# Patient Record
Sex: FEMALE | Race: WHITE | Employment: OTHER | URBAN - METROPOLITAN AREA
[De-identification: names, ages, dates, MRNs, and addresses within clinical notes are randomized per-mention and may not be internally consistent; named-entity substitution may affect disease eponyms.]

---

## 2023-07-26 ENCOUNTER — HOSPITAL ENCOUNTER (INPATIENT)
Age: 68
LOS: 1 days | Discharge: HOME | End: 2023-07-27
Attending: EMERGENCY MEDICINE | Admitting: FAMILY MEDICINE
Payer: COMMERCIAL

## 2023-07-26 ENCOUNTER — APPOINTMENT (OUTPATIENT)
Dept: GENERAL RADIOLOGY | Age: 68
End: 2023-07-26
Payer: COMMERCIAL

## 2023-07-26 DIAGNOSIS — R00.1 SYMPTOMATIC BRADYCARDIA: Primary | ICD-10-CM

## 2023-07-26 DIAGNOSIS — I44.0 FIRST DEGREE AV BLOCK: ICD-10-CM

## 2023-07-26 PROBLEM — R79.89 ELEVATED TSH: Status: ACTIVE | Noted: 2023-07-26

## 2023-07-26 PROBLEM — T60.0X1A: Status: ACTIVE | Noted: 2023-07-26

## 2023-07-26 PROBLEM — D64.9 ANEMIA: Status: ACTIVE | Noted: 2023-07-26

## 2023-07-26 PROBLEM — D89.89 AUTOIMMUNE DISORDER (HCC): Status: ACTIVE | Noted: 2023-07-26

## 2023-07-26 PROBLEM — A69.20 LYME DISEASE: Status: ACTIVE | Noted: 2023-07-26

## 2023-07-26 LAB
ALBUMIN SERPL-MCNC: 3.5 G/DL (ref 3.5–5.2)
ALBUMIN/GLOB SERPL: 1.3 {RATIO} (ref 1–2.5)
ALP SERPL-CCNC: 69 U/L (ref 35–104)
ALT SERPL-CCNC: 7 U/L (ref 5–33)
ANION GAP SERPL CALCULATED.3IONS-SCNC: 10 MMOL/L (ref 9–17)
AST SERPL-CCNC: 12 U/L
BASOPHILS # BLD: 0 K/UL (ref 0–0.2)
BASOPHILS NFR BLD: 1 % (ref 0–2)
BILIRUB SERPL-MCNC: 0.4 MG/DL (ref 0.3–1.2)
BUN SERPL-MCNC: 17 MG/DL (ref 8–23)
CALCIUM SERPL-MCNC: 8.8 MG/DL (ref 8.6–10.4)
CHLORIDE SERPL-SCNC: 105 MMOL/L (ref 98–107)
CO2 SERPL-SCNC: 26 MMOL/L (ref 20–31)
CREAT SERPL-MCNC: 1 MG/DL (ref 0.5–0.9)
EOSINOPHIL # BLD: 0.3 K/UL (ref 0–0.4)
EOSINOPHILS RELATIVE PERCENT: 4 % (ref 1–4)
ERYTHROCYTE [DISTWIDTH] IN BLOOD BY AUTOMATED COUNT: 13.5 % (ref 12.5–15.4)
GFR SERPL CREATININE-BSD FRML MDRD: >60 ML/MIN/1.73M2
GLUCOSE SERPL-MCNC: 141 MG/DL (ref 70–99)
HCT VFR BLD AUTO: 35 % (ref 36–46)
HGB BLD-MCNC: 11.5 G/DL (ref 12–16)
LYMPHOCYTES NFR BLD: 2.1 K/UL (ref 1–4.8)
LYMPHOCYTES RELATIVE PERCENT: 32 % (ref 24–44)
MCH RBC QN AUTO: 29.3 PG (ref 26–34)
MCHC RBC AUTO-ENTMCNC: 32.8 G/DL (ref 31–37)
MCV RBC AUTO: 89.4 FL (ref 80–100)
METER GLUCOSE: 139 MG/DL (ref 65–105)
MONOCYTES NFR BLD: 0.4 K/UL (ref 0.1–1.2)
MONOCYTES NFR BLD: 7 % (ref 2–11)
NEUTROPHILS NFR BLD: 56 % (ref 36–66)
NEUTS SEG NFR BLD: 3.7 K/UL (ref 1.8–7.7)
PLATELET # BLD AUTO: 182 K/UL (ref 140–450)
PMV BLD AUTO: 8 FL (ref 6–12)
POTASSIUM SERPL-SCNC: 3.9 MMOL/L (ref 3.7–5.3)
PROT SERPL-MCNC: 6.2 G/DL (ref 6.4–8.3)
RBC # BLD AUTO: 3.92 M/UL (ref 4–5.2)
SODIUM SERPL-SCNC: 141 MMOL/L (ref 135–144)
T4 FREE SERPL-MCNC: 1 NG/DL (ref 0.9–1.7)
TROPONIN I SERPL HS-MCNC: <6 NG/L (ref 0–14)
TROPONIN I SERPL HS-MCNC: <6 NG/L (ref 0–14)
TSH SERPL DL<=0.05 MIU/L-ACNC: 5.56 UIU/ML (ref 0.3–5)
WBC OTHER # BLD: 6.6 K/UL (ref 3.5–11)

## 2023-07-26 PROCEDURE — 99222 1ST HOSP IP/OBS MODERATE 55: CPT | Performed by: FAMILY MEDICINE

## 2023-07-26 PROCEDURE — 84484 ASSAY OF TROPONIN QUANT: CPT

## 2023-07-26 PROCEDURE — 82947 ASSAY GLUCOSE BLOOD QUANT: CPT

## 2023-07-26 PROCEDURE — 36415 COLL VENOUS BLD VENIPUNCTURE: CPT

## 2023-07-26 PROCEDURE — 80053 COMPREHEN METABOLIC PANEL: CPT

## 2023-07-26 PROCEDURE — 99223 1ST HOSP IP/OBS HIGH 75: CPT | Performed by: FAMILY MEDICINE

## 2023-07-26 PROCEDURE — 84439 ASSAY OF FREE THYROXINE: CPT

## 2023-07-26 PROCEDURE — 1210000000 HC MED SURG R&B

## 2023-07-26 PROCEDURE — 93005 ELECTROCARDIOGRAM TRACING: CPT | Performed by: NURSE PRACTITIONER

## 2023-07-26 PROCEDURE — 84443 ASSAY THYROID STIM HORMONE: CPT

## 2023-07-26 PROCEDURE — 2580000003 HC RX 258: Performed by: NURSE PRACTITIONER

## 2023-07-26 PROCEDURE — 99285 EMERGENCY DEPT VISIT HI MDM: CPT

## 2023-07-26 PROCEDURE — 2580000003 HC RX 258

## 2023-07-26 PROCEDURE — 71045 X-RAY EXAM CHEST 1 VIEW: CPT

## 2023-07-26 PROCEDURE — 85027 COMPLETE CBC AUTOMATED: CPT

## 2023-07-26 RX ORDER — ENOXAPARIN SODIUM 100 MG/ML
40 INJECTION SUBCUTANEOUS DAILY
Status: DISCONTINUED | OUTPATIENT
Start: 2023-07-26 | End: 2023-07-27 | Stop reason: HOSPADM

## 2023-07-26 RX ORDER — ONDANSETRON 2 MG/ML
4 INJECTION INTRAMUSCULAR; INTRAVENOUS EVERY 6 HOURS PRN
Status: DISCONTINUED | OUTPATIENT
Start: 2023-07-26 | End: 2023-07-27 | Stop reason: HOSPADM

## 2023-07-26 RX ORDER — SODIUM CHLORIDE 9 MG/ML
INJECTION, SOLUTION INTRAVENOUS PRN
Status: DISCONTINUED | OUTPATIENT
Start: 2023-07-26 | End: 2023-07-27 | Stop reason: HOSPADM

## 2023-07-26 RX ORDER — POLYETHYLENE GLYCOL 3350 17 G/17G
17 POWDER, FOR SOLUTION ORAL DAILY PRN
Status: DISCONTINUED | OUTPATIENT
Start: 2023-07-26 | End: 2023-07-27 | Stop reason: HOSPADM

## 2023-07-26 RX ORDER — SODIUM CHLORIDE 0.9 % (FLUSH) 0.9 %
5-40 SYRINGE (ML) INJECTION EVERY 12 HOURS SCHEDULED
Status: DISCONTINUED | OUTPATIENT
Start: 2023-07-26 | End: 2023-07-27 | Stop reason: HOSPADM

## 2023-07-26 RX ORDER — ACETAMINOPHEN 325 MG/1
650 TABLET ORAL EVERY 6 HOURS PRN
Status: DISCONTINUED | OUTPATIENT
Start: 2023-07-26 | End: 2023-07-27 | Stop reason: HOSPADM

## 2023-07-26 RX ORDER — ACETAMINOPHEN 650 MG/1
650 SUPPOSITORY RECTAL EVERY 6 HOURS PRN
Status: DISCONTINUED | OUTPATIENT
Start: 2023-07-26 | End: 2023-07-27 | Stop reason: HOSPADM

## 2023-07-26 RX ORDER — POTASSIUM CHLORIDE 7.45 MG/ML
10 INJECTION INTRAVENOUS PRN
Status: DISCONTINUED | OUTPATIENT
Start: 2023-07-26 | End: 2023-07-27 | Stop reason: HOSPADM

## 2023-07-26 RX ORDER — SODIUM CHLORIDE 0.9 % (FLUSH) 0.9 %
5-40 SYRINGE (ML) INJECTION PRN
Status: DISCONTINUED | OUTPATIENT
Start: 2023-07-26 | End: 2023-07-27 | Stop reason: HOSPADM

## 2023-07-26 RX ORDER — 0.9 % SODIUM CHLORIDE 0.9 %
1000 INTRAVENOUS SOLUTION INTRAVENOUS ONCE
Status: COMPLETED | OUTPATIENT
Start: 2023-07-26 | End: 2023-07-26

## 2023-07-26 RX ORDER — POTASSIUM CHLORIDE 20 MEQ/1
40 TABLET, EXTENDED RELEASE ORAL PRN
Status: DISCONTINUED | OUTPATIENT
Start: 2023-07-26 | End: 2023-07-27 | Stop reason: HOSPADM

## 2023-07-26 RX ORDER — ONDANSETRON 4 MG/1
4 TABLET, ORALLY DISINTEGRATING ORAL EVERY 8 HOURS PRN
Status: DISCONTINUED | OUTPATIENT
Start: 2023-07-26 | End: 2023-07-27 | Stop reason: HOSPADM

## 2023-07-26 RX ORDER — MAGNESIUM SULFATE IN WATER 40 MG/ML
2000 INJECTION, SOLUTION INTRAVENOUS PRN
Status: DISCONTINUED | OUTPATIENT
Start: 2023-07-26 | End: 2023-07-27 | Stop reason: HOSPADM

## 2023-07-26 RX ADMIN — SODIUM CHLORIDE, PRESERVATIVE FREE 10 ML: 5 INJECTION INTRAVENOUS at 22:49

## 2023-07-26 RX ADMIN — SODIUM CHLORIDE 1000 ML: 9 INJECTION, SOLUTION INTRAVENOUS at 13:59

## 2023-07-26 ASSESSMENT — ENCOUNTER SYMPTOMS
NAUSEA: 1
SHORTNESS OF BREATH: 0
BLOOD IN STOOL: 0
ABDOMINAL PAIN: 0
CONSTIPATION: 0
DIARRHEA: 0
NAUSEA: 0
VOMITING: 0

## 2023-07-26 NOTE — PLAN OF CARE
Problem: Safety - Adult  Goal: Free from fall injury  Outcome: Progressing  Flowsheets (Taken 7/26/2023 1883)  Free From Fall Injury:   Instruct family/caregiver on patient safety   Based on caregiver fall risk screen, instruct family/caregiver to ask for assistance with transferring infant if caregiver noted to have fall risk factors

## 2023-07-26 NOTE — ED PROVIDER NOTES
unlikely at this time. We will plan to transfer/admit the patient to complete cardiac evaluation and observation. I have reviewed the disposition diagnosis with the patient and or their family/guardian. I have answered their questions and given discharge instructions. They voiced understanding of these instructions and did not have any further questions or complaints. REASSESSMENT     ED Course as of 07/26/23 1613   Wed Jul 26, 2023   1331 Patient's EKG was done in the emergency department. Patient does have what appears to be a first-degree block. Patient's EKG is not reading as a first-degree block. But there is also depression in lead III. No previous EKGs to compare to. This is not a STEMI at this time interpreted as first-degree block sinus rhythm. We will repeat EKG in 10 minutes. [AT]   6119 Patient's EKG was done in the emergency department part 2. Reviewed repeat EKG secondary to patient's ST change at lead III. Patient has EKG done at this time showing ventricular rate of 63. IN of 212. QRS is 78. QTc of 435. This is sinus rhythm without large ST changes. This is interpreted as sinus rhythm with first-degree block. [AT]   6602 Spoke with Dr. Dewayne Campuzano who is agreeable to admit the patient   [MR]      ED Course User Index  [AT] Brandon Modi To, DO  [MR] Isela Casey, YUSUF - BOO         CRITICAL CARE TIME       CONSULTS:  IP CONSULT TO CARDIOLOGY    PROCEDURES:  Unless otherwise noted below, none     Procedures        FINAL IMPRESSION      1. Symptomatic bradycardia          DISPOSITION/PLAN   DISPOSITION Admitted 07/26/2023 03:57:06 PM      PATIENT REFERRED TO:  No follow-up provider specified. DISCHARGE MEDICATIONS:  New Prescriptions    No medications on file     Controlled Substances Monitoring:     No flowsheet data found.     (Please note that portions of this note were completed with a voice recognition program.  Efforts were made to edit the dictations but occasionally words are mis-transcribed.)    YUSUF Ansari CNP (electronically signed)  Attending Emergency Physician           YUSUF Ansari CNP  07/26/23 1000 [facility], APRN - CNP  07/26/23 5987

## 2023-07-26 NOTE — H&P
[address] [place] [address] Medicine Residency  Inpatient Service     HISTORY AND PHYSICAL EXAMINATION            Date:   7/26/2023  Patient name:  Fly Egan  Date of admission:  7/26/2023  1:26 PM  MRN:   [id number]  Account:  [de-identified]  YOB: 1955  PCP:    YUSUF Clarke CNP  Room:   [address] [place] [address]  Code Status:    Full Code      History Obtained From:     patient    History of Present Illness:       Patient is a 71 yo female with PMHx of bursitis of shoulder, myositis, and polymyalgia who presented to the ED with bradycardia and dizziness. Patient is admitted for management of anemia and bradycardia. Patient states this is the fourth time these symptoms have presented but she has never had  bradycardia to her knowledge, prior events were due to what she believes, low blood glucose levels. Patient has not seen a doctor in many years and isn't sure about her past medical history. Patient states that she was driving when out of a sudden she started feeling dizzy, stopped at a gas station and lowered herself to the ground. When EMS arrived and was loaded onto the truck patient became less responsive and they noted her heart rate to slow to 37. EMS gave 0.5 atropine and normal saline 500 mL. Patient never lost consciousness and on arrival to the ED patient's heart rate normalized back to the 70s. Patient reported that early in the morning she had a little bit of nausea went to the bathroom and had a bowel movement, afterwards had a peanut butter sandwich before leaving the house. Patient denies a known history of hypertension, diabetes, or thyroid disease. Patient's blood pressure has been mildly elevated and heart rates have remain in the 70's since she's been here. Patient denies taking any prescription medication and only takes over-the-counter CBD Gummies and oil and probiotics. Patient has a family history of cardiac disease.   Patient also mentions Axis 18 degrees    T Axis 66 degrees   Troponin    Collection Time: 07/26/23  4:11 PM   Result Value Ref Range    Troponin, High Sensitivity <6 0 - 14 ng/L       Imaging/Diagnostics:  XR CHEST PORTABLE    Result Date: 7/26/2023  No evidence of acute process. Moderate size hiatal hernia. 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    Autoimmune disorder ([address]) 7/26/2023 Yes    Lyme disease 7/26/2023 Yes     Plan:     Symptomatic Bradycardia  Troponin negative x 2  EKG shows 1st degree block, P-R interval 212   CXR 7/26/2023 no evidence of acute process, moderate size hiatal hernia   Ordered BENTLEY level, may have an autoimmune disease like Lupus   Lyme Abs ordered, pt does state she has had multiple ticks in past but unaware of ever having any target lesions  ACE level for possibility sarcoidosis(note CXR unremarkable)  Organophosphate poisoning considered but no true exposures to patient's knowledge;no abnormal neurologic findings  Consulted cardiology   Telemetry  Anemia   Hbg 11.5  Continue to monitor   Elevated TSH  TSH 5.56  Free T4 1.0         Patient is admitted as inpatient status because of co-morbidities listed above, severity of signs and symptoms as outlined, requirement for current medical therapies and most importantly because of direct risk to patient if care not provided in a hospital setting. Expected length of stay > 48 hours.  Patient is admitted in the Med/Surge    Anticoagulation: Lovenox QD  Dispo: home     Patient was seen, evaluated and discussed with MD Quang Carolina MD  Family Medicine Resident, PGY-1   7/26/2023  7:31 PM    Copy sent to  YUSUF Andrea CNP     Senior Resident Attestation:    I have independently seen Denisse Likinjals and discussed the

## 2023-07-27 ENCOUNTER — APPOINTMENT (OUTPATIENT)
Age: 68
End: 2023-07-27
Payer: COMMERCIAL

## 2023-07-27 VITALS
WEIGHT: 134 LBS | OXYGEN SATURATION: 96 % | DIASTOLIC BLOOD PRESSURE: 77 MMHG | HEART RATE: 63 BPM | RESPIRATION RATE: 16 BRPM | HEIGHT: 62 IN | SYSTOLIC BLOOD PRESSURE: 158 MMHG | TEMPERATURE: 97.9 F | BODY MASS INDEX: 24.66 KG/M2

## 2023-07-27 LAB
ACE SERPL-CCNC: 27 U/L (ref 8–52)
ALBUMIN SERPL-MCNC: 3.5 G/DL (ref 3.5–5.2)
ALBUMIN/GLOB SERPL: 1.2 {RATIO} (ref 1–2.5)
ALP SERPL-CCNC: 71 U/L (ref 35–104)
ALT SERPL-CCNC: 7 U/L (ref 5–33)
ANION GAP SERPL CALCULATED.3IONS-SCNC: 8 MMOL/L (ref 9–17)
AST SERPL-CCNC: 13 U/L
BASOPHILS # BLD: 0 K/UL (ref 0–0.2)
BASOPHILS NFR BLD: 1 % (ref 0–2)
BILIRUB SERPL-MCNC: 0.5 MG/DL (ref 0.3–1.2)
BUN SERPL-MCNC: 14 MG/DL (ref 8–23)
CALCIUM SERPL-MCNC: 8.8 MG/DL (ref 8.6–10.4)
CHLORIDE SERPL-SCNC: 106 MMOL/L (ref 98–107)
CO2 SERPL-SCNC: 26 MMOL/L (ref 20–31)
CREAT SERPL-MCNC: 0.9 MG/DL (ref 0.5–0.9)
ECHO AO ROOT DIAM: 3.5 CM
ECHO AO ROOT INDEX: 2.17 CM/M2
ECHO AV AREA PEAK VELOCITY: 2.7 CM2
ECHO AV AREA VTI: 2.5 CM2
ECHO AV AREA/BSA PEAK VELOCITY: 1.7 CM2/M2
ECHO AV AREA/BSA VTI: 1.6 CM2/M2
ECHO AV MEAN GRADIENT: 4 MMHG
ECHO AV MEAN VELOCITY: 0.9 M/S
ECHO AV PEAK GRADIENT: 6 MMHG
ECHO AV PEAK VELOCITY: 1.3 M/S
ECHO AV VELOCITY RATIO: 0.92
ECHO AV VTI: 32.6 CM
ECHO BSA: 1.63 M2
ECHO BSA: 1.63 M2
ECHO EST RA PRESSURE: 5 MMHG
ECHO IVC PROX: 1.5 CM
ECHO LA AREA 2C: 12.6 CM2
ECHO LA AREA 4C: 16.8 CM2
ECHO LA DIAMETER INDEX: 1.93 CM/M2
ECHO LA DIAMETER: 3.1 CM
ECHO LA MAJOR AXIS: 5.5 CM
ECHO LA MINOR AXIS: 3.8 CM
ECHO LA TO AORTIC ROOT RATIO: 0.89
ECHO LA VOL 2C: 34 ML (ref 22–52)
ECHO LA VOL 4C: 41 ML (ref 22–52)
ECHO LA VOLUME INDEX A2C: 21 ML/M2 (ref 16–34)
ECHO LA VOLUME INDEX A4C: 25 ML/M2 (ref 16–34)
ECHO LV E' LATERAL VELOCITY: 10 CM/S
ECHO LV E' SEPTAL VELOCITY: 6 CM/S
ECHO LV EDV A2C: 39 ML
ECHO LV EDV A4C: 45 ML
ECHO LV EDV INDEX A4C: 28 ML/M2
ECHO LV EDV NDEX A2C: 24 ML/M2
ECHO LV EJECTION FRACTION A2C: 60 %
ECHO LV EJECTION FRACTION A4C: 64 %
ECHO LV EJECTION FRACTION BIPLANE: 60 % (ref 55–100)
ECHO LV ESV A2C: 16 ML
ECHO LV ESV A4C: 16 ML
ECHO LV ESV INDEX A2C: 10 ML/M2
ECHO LV ESV INDEX A4C: 10 ML/M2
ECHO LV FRACTIONAL SHORTENING: 31 % (ref 28–44)
ECHO LV INTERNAL DIMENSION DIASTOLE INDEX: 2.42 CM/M2
ECHO LV INTERNAL DIMENSION DIASTOLIC: 3.9 CM (ref 3.9–5.3)
ECHO LV INTERNAL DIMENSION SYSTOLIC INDEX: 1.68 CM/M2
ECHO LV INTERNAL DIMENSION SYSTOLIC: 2.7 CM
ECHO LV IVSD: 1 CM (ref 0.6–0.9)
ECHO LV MASS 2D: 113.6 G (ref 67–162)
ECHO LV MASS INDEX 2D: 70.5 G/M2 (ref 43–95)
ECHO LV POSTERIOR WALL DIASTOLIC: 0.9 CM (ref 0.6–0.9)
ECHO LV RELATIVE WALL THICKNESS RATIO: 0.46
ECHO LVOT AREA: 2.8 CM2
ECHO LVOT AV VTI INDEX: 0.86
ECHO LVOT DIAM: 1.9 CM
ECHO LVOT MEAN GRADIENT: 3 MMHG
ECHO LVOT PEAK GRADIENT: 5 MMHG
ECHO LVOT PEAK VELOCITY: 1.2 M/S
ECHO LVOT STROKE VOLUME INDEX: 49.5 ML/M2
ECHO LVOT SV: 79.6 ML
ECHO LVOT VTI: 28.1 CM
ECHO MV A VELOCITY: 0.72 M/S
ECHO MV AREA VTI: 2.7 CM2
ECHO MV E DECELERATION TIME (DT): 201 MS
ECHO MV E VELOCITY: 0.68 M/S
ECHO MV E/A RATIO: 0.94
ECHO MV E/E' LATERAL: 6.8
ECHO MV E/E' RATIO (AVERAGED): 9.07
ECHO MV E/E' SEPTAL: 11.33
ECHO MV LVOT VTI INDEX: 1.06
ECHO MV MAX VELOCITY: 0.8 M/S
ECHO MV MEAN GRADIENT: 1 MMHG
ECHO MV MEAN VELOCITY: 0.5 M/S
ECHO MV PEAK GRADIENT: 3 MMHG
ECHO MV VTI: 29.9 CM
ECHO PV MAX VELOCITY: 0.8 M/S
ECHO PV PEAK GRADIENT: 2 MMHG
ECHO RA AREA 4C: 10.2 CM2
ECHO RIGHT VENTRICULAR SYSTOLIC PRESSURE (RVSP): 25 MMHG
ECHO RV FREE WALL PEAK S': 11 CM/S
ECHO RV INTERNAL DIMENSION: 3.4 CM
ECHO RV TAPSE: 2.2 CM (ref 1.7–?)
ECHO TV E WAVE: 0.5 M/S
ECHO TV REGURGITANT MAX VELOCITY: 2.24 M/S
ECHO TV REGURGITANT PEAK GRADIENT: 20 MMHG
EKG ATRIAL RATE: 63 BPM
EKG P AXIS: 58 DEGREES
EKG P-R INTERVAL: 212 MS
EKG Q-T INTERVAL: 426 MS
EKG QRS DURATION: 78 MS
EKG QTC CALCULATION (BAZETT): 435 MS
EKG R AXIS: 18 DEGREES
EKG T AXIS: 66 DEGREES
EKG VENTRICULAR RATE: 63 BPM
EOSINOPHIL # BLD: 0.3 K/UL (ref 0–0.4)
EOSINOPHILS RELATIVE PERCENT: 5 % (ref 1–4)
ERYTHROCYTE [DISTWIDTH] IN BLOOD BY AUTOMATED COUNT: 13.7 % (ref 12.5–15.4)
GFR SERPL CREATININE-BSD FRML MDRD: >60 ML/MIN/1.73M2
GLUCOSE SERPL-MCNC: 106 MG/DL (ref 70–99)
HCT VFR BLD AUTO: 37.1 % (ref 36–46)
HGB BLD-MCNC: 12.3 G/DL (ref 12–16)
LYMPHOCYTES NFR BLD: 2.2 K/UL (ref 1–4.8)
LYMPHOCYTES RELATIVE PERCENT: 37 % (ref 24–44)
MAGNESIUM SERPL-MCNC: 2.1 MG/DL (ref 1.6–2.6)
MCH RBC QN AUTO: 29.6 PG (ref 26–34)
MCHC RBC AUTO-ENTMCNC: 33.2 G/DL (ref 31–37)
MCV RBC AUTO: 89.1 FL (ref 80–100)
MONOCYTES NFR BLD: 0.5 K/UL (ref 0.1–1.2)
MONOCYTES NFR BLD: 9 % (ref 2–11)
NEUTROPHILS NFR BLD: 48 % (ref 36–66)
NEUTS SEG NFR BLD: 2.9 K/UL (ref 1.8–7.7)
PLATELET # BLD AUTO: 201 K/UL (ref 140–450)
PMV BLD AUTO: 8.1 FL (ref 6–12)
POTASSIUM SERPL-SCNC: 3.9 MMOL/L (ref 3.7–5.3)
PROT SERPL-MCNC: 6.4 G/DL (ref 6.4–8.3)
RBC # BLD AUTO: 4.17 M/UL (ref 4–5.2)
SODIUM SERPL-SCNC: 140 MMOL/L (ref 135–144)
WBC OTHER # BLD: 5.9 K/UL (ref 3.5–11)

## 2023-07-27 PROCEDURE — 86617 LYME DISEASE ANTIBODY: CPT

## 2023-07-27 PROCEDURE — 2580000003 HC RX 258

## 2023-07-27 PROCEDURE — 93306 TTE W/DOPPLER COMPLETE: CPT

## 2023-07-27 PROCEDURE — 85027 COMPLETE CBC AUTOMATED: CPT

## 2023-07-27 PROCEDURE — 93306 TTE W/DOPPLER COMPLETE: CPT | Performed by: INTERNAL MEDICINE

## 2023-07-27 PROCEDURE — 36415 COLL VENOUS BLD VENIPUNCTURE: CPT

## 2023-07-27 PROCEDURE — 86618 LYME DISEASE ANTIBODY: CPT

## 2023-07-27 PROCEDURE — 86225 DNA ANTIBODY NATIVE: CPT

## 2023-07-27 PROCEDURE — 93270 REMOTE 30 DAY ECG REV/REPORT: CPT

## 2023-07-27 PROCEDURE — 86038 ANTINUCLEAR ANTIBODIES: CPT

## 2023-07-27 PROCEDURE — 6370000000 HC RX 637 (ALT 250 FOR IP): Performed by: STUDENT IN AN ORGANIZED HEALTH CARE EDUCATION/TRAINING PROGRAM

## 2023-07-27 PROCEDURE — 82164 ANGIOTENSIN I ENZYME TEST: CPT

## 2023-07-27 PROCEDURE — 83735 ASSAY OF MAGNESIUM: CPT

## 2023-07-27 PROCEDURE — 80053 COMPREHEN METABOLIC PANEL: CPT

## 2023-07-27 RX ORDER — LOSARTAN POTASSIUM 25 MG/1
25 TABLET ORAL DAILY
Status: DISCONTINUED | OUTPATIENT
Start: 2023-07-27 | End: 2023-07-27 | Stop reason: HOSPADM

## 2023-07-27 RX ORDER — LOSARTAN POTASSIUM 50 MG/1
50 TABLET ORAL DAILY
Qty: 30 TABLET | Refills: 1 | Status: SHIPPED | OUTPATIENT
Start: 2023-07-27

## 2023-07-27 RX ADMIN — SODIUM CHLORIDE, PRESERVATIVE FREE 10 ML: 5 INJECTION INTRAVENOUS at 09:39

## 2023-07-27 RX ADMIN — LOSARTAN POTASSIUM 25 MG: 25 TABLET, FILM COATED ORAL at 11:12

## 2023-07-27 NOTE — CONSULTS
[facility]    Consult                        Today's Date: 7/27/2023  Patient Name: Arlene Martinez  Date of admission: 7/26/2023  1:26 PM  Patient's age: 70 y.o., 1955  Admission Dx: Bradycardia [R00.1]  Symptomatic bradycardia [R00.1]    Reason for Consult:  Cardiac evaluation    Requesting Physician: Paula Moe MD    CHIEF COMPLAINT:  N/V, dizziness, bradycardia    History Obtained From:  patient, electronic medical record    HISTORY OF PRESENT ILLNESS:      The patient is a 70 y.o.  female who is admitted to the hospital for N/V, symptomatic bradycardia on presentation. Patient is a 69 yo female with PMHx of bursitis of shoulder, myositis, and polymyalgia who presented to the ED with bradycardia and dizziness. Patient is admitted for management of anemia and bradycardia. Patient states this is the fourth time these symptoms have presented but she has never had  bradycardia to her knowledge, prior events were due to what she believes, low blood glucose levels. Patient has not seen a doctor since 2014 and isn't sure about her past medical history. Patient states that she was driving when out of a sudden she started feeling dizzy, stopped at a gas station and lowered herself to the ground. When EMS arrived and was loaded onto the truck patient became less responsive and they noted her heart rate to slow to 37. EMS gave 0.5 atropine and normal saline 500 mL. Patient never lost consciousness and on arrival to the ED patient's heart rate normalized back to the 70s. Patient reported that early in the morning she had a little bit of nausea went to the bathroom and had a bowel movement, afterwards had a peanut butter sandwich before leaving the house. Patient denies a known history of hypertension, diabetes, or thyroid disease. Patient's blood pressure has been mildly elevated and heart rates have remain in the 70's since she's been here.   Patient denies taking any Results   Component Value Date/Time    HDL 39 03/27/2015 09:00 AM    TRIG 121 03/27/2015 09:00 AM     LIVER PROFILE:  Recent Labs     07/26/23  1322 07/27/23  0725   AST 12 13   ALT 7 7   LABALBU 3.5 3.5       IMPRESSION:    Patient Active Problem List   Diagnosis    Hip pain    Frozen shoulder syndrome    Impaired glucose tolerance    Grieving    Post-herpetic polyneuropathy    Symptomatic bradycardia    First degree AV block    Anemia    Elevated TSH     Dizziness  Bradycardia with first degree AVB  Impaired glucose tolerance  Arthritis  Elevated TSH  Hx of anemia    RECOMMENDATIONS:  Stable. Patient has not had any further bradycardia since being on floor. Tele reviewed showing SR with MS 0.22 and HR 60. Denies any further symptoms. States she has had these \"episodes\" several other times but not to this extreme and each other time they resolved after eating so she figured they were \"sugar crashing\" events. Will check echo  Recommend holter monitor for 3 weeks upon discharge  Discussed importance of liberalized PO fluids, Gatorae G2/Powerade Zero daily, and changing positions slowly. Mildly elevated TSH per primary  Keep K >4 and Mg >2. Patient feeling very well now. If echo low risk and no further symptoms after getting up and walking may be discharged from CV standpoint with holter monitor for 3 weeks and OP f/u in clinic. Discussed in detail with patient. Questions/concerns addressed and verb understanding.        Discussed with patient and Nurse    Micah Lopez, APRN - [address] [facility],[address] Cardiology Consult           [phone]

## 2023-07-27 NOTE — DISCHARGE INSTRUCTIONS
Date of admission: 7/26/2023  Date of discharge: 07/27/23    Your care was provided by the attending and resident physicians of the [address] [place] [address] Residency Program. The primary encounter diagnosis was Symptomatic bradycardia. A diagnosis of First degree AV block was also pertinent to this visit. FOLLOW-UP  - Follow up with your primary care physician (Dr. Emerita Nieto is accepting new patients) in 1  weeks. Please call the office for a follow up appointment. - Follow up with your cardiology in 3  weeks. MEDICATIONS  -  your new blood pressure medication from the pharmacy and take as directed. - Continue taking your other home medications as directed. ADDITIONAL INSTRUCTIONS  - Return to the ER for passing out, chest pain, shortness of breath.

## 2023-07-27 NOTE — DISCHARGE SUMMARY
[address] [place] [address] Residency  Inpatient Service    Discharge Summary     Patient ID: Adelaida Forman  :  [id number]   MRN: [id number]     ACCOUNT:  [de-identified]   Patient's PCP: YUSUF Saez CNP  Admit Date: 2023   Discharge Date: 2023  Length of Stay: 1  Code Status:  Full Code  Admitting Physician: Levy Shelton MD  Discharge Physician: Carlos Schwab MD     Active Discharge Diagnoses:     Hospital Problem Lists:  Principal Problem:    Symptomatic bradycardia  Active Problems:    First degree AV block    Anemia    Elevated TSH  Resolved Problems:    * No resolved hospital problems. *      Admission Condition:  fair     Discharged Condition: good    Hospital Stay:     Hospital Course:  Adelaida Forman is a 70 y.o. female with a PMH of bursitis of right shoulder, myositis, and polymyalgia presented to ED with bradycardia and dizziness. Patient was admitted for the management of symptomatic bradycardia. In the ED, patient was mildly hypertensive with normal heart rates. Patient CBC showed anemia, mildly hyperglycemia, elevated TSH, normal T4, and normal troponin x2. EKG showed a sinus rhythm with first-degree AV block, with a DE interval of 212. Chest x-ray showed no evidence of acute process, moderate size hiatal hernia. Patient was given fluids and saturating in room air at 98%. In admission patient has not had any further bradycardia episodes. Cardiology was consulted and performed a echocardiogram showing normal left ventricular systolic function, estimated EF 55-60%. Normal diastolic function, normal RVSP, 25 mmHg. Patient was talked about the importance of hydration. Per cardiology recommendations patient was discharged on losartan for hypertension and with a holter monitor for 3 weeks and is to follow up as outpatient.          Significant therapeutic interventions:  IV fluids     Significant Diagnostic Studies:     Echocardiogram

## 2023-07-27 NOTE — CARE COORDINATION
to RETURNING to current housing: needs pcp   Potential Assistance needed at discharge: N/A            Potential DME:    Patient expects to discharge to: [address] for transportation at discharge: Family    Financial    Payor: Elizabet Odonnell / Plan: [employer] / Product Type: *No Product type* /     Does insurance require precert for SNF: Yes    Potential assistance Purchasing Medications: No  Meds-to-Beds request:        [id number] [place], [address] [phone] - F [address] [place] [id number]  Phone: [phone] Fax: [phone]    [facility] [id number] - [place], [address] [phone] - f [phone]  [address] [facility] [address]  Phone: [phone] Fax: [phone]      Notes:    Factors facilitating achievement of predicted outcomes: Family support, Cooperative, and Pleasant    Barriers to discharge: No pcp     Additional Case Management Notes:   Patient has been staying with her mother at her SI apartments at the [facility]. She has not been to a PCP in 5 years. She does have list at home from her insurance company and declined list from Adelaida Peabody. Admitted with bradycardia and cardiology has been consulted. She was encouraged to follow up with pcp at time of discharge. The Plan for Transition of Care is related to the following treatment goals of Bradycardia [R00.1]  Symptomatic bradycardia [Y50.0]    IF APPLICABLE: The Patient and/or patient representative Florence Wadsworth and her family were provided with a choice of provider and agrees with the discharge plan. Freedom of choice list with basic dialogue that supports the patient's individualized plan of care/goals and shares the quality data associated with the providers was provided to: Patient   Patient Representative Name:       The Patient and/or Patient Representative Agree with the Discharge Plan?  Yes    Angeles Pederson RN  Case Management Department

## 2023-07-27 NOTE — PROGRESS NOTES
Pt given discharge instructions. All questions answered. Event monitor applied, and pt verbalizes understanding. Pt discharged home with all documented belongings.

## 2023-07-27 NOTE — ACP (ADVANCE CARE PLANNING)
Advance Care Planning     Advance Care Planning Activator (Inpatient)  Conversation Note      Date of ACP Conversation: 7/27/2023     Conversation Conducted with: Patient with Decision Making Capacity    ACP Activator: Hari Henry RN          Health Care Decision Maker:     Current Designated Health Care Decision Maker:     Primary Decision Maker: Elena Apodaca - Brother/Sister - [phone]  Click here to complete Healthcare Decision Makers including section of the Healthcare Decision Maker Relationship (ie \"Primary\")  Today we documented Decision Maker(s) consistent with Legal Next of Kin hierarchy. Care Preferences    Ventilation: \"If you were in your present state of health and suddenly became very ill and were unable to breathe on your own, what would your preference be about the use of a ventilator (breathing machine) if it were available to you? \"      Would the patient desire the use of ventilator (breathing machine)?: yes    \"If your health worsens and it becomes clear that your chance of recovery is unlikely, what would your preference be about the use of a ventilator (breathing machine) if it were available to you? \"     Would the patient desire the use of ventilator (breathing machine)?: No      Resuscitation  \"CPR works best to restart the heart when there is a sudden event, like a heart attack, in someone who is otherwise healthy. Unfortunately, CPR does not typically restart the heart for people who have serious health conditions or who are very sick. \"    \"In the event your heart stopped as a result of an underlying serious health condition, would you want attempts to be made to restart your heart (answer \"yes\" for attempt to resuscitate) or would you prefer a natural death (answer \"no\" for do not attempt to resuscitate)? \" yes       [] Yes   [] No   Educated Patient / Marika Landa regarding differences between Advance Directives and portable DNR orders.     Length of ACP Conversation in minutes:      Conversation Outcomes:  ACP discussion completed    Follow-up plan:    [] Schedule follow-up conversation to continue planning  [] Referred individual to Provider for additional questions/concerns   [] Advised patient/agent/surrogate to review completed ACP document and update if needed with changes in condition, patient preferences or care setting    [] This note routed to one or more involved healthcare providers

## 2023-07-27 NOTE — PROGRESS NOTES
[facility]  PROGRESS NOTE    Room # 076/906-18   Name: Victorina Myers            Denominational: Ashley Hernández      Reason for visit: Routine    I visited the patient. Admit Date & Time: 7/26/2023  1:26 PM    Assessment:  Victorina Myers is a 70 y.o. female in the hospital because \"symptomatic bradycardia\". Upon entering the room patient was lying in bed. Patient shared being concerned about her health. Patient also shared being concerned about her cat who is at home and relies solely on pt. 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 Patient shared feeling overwhelmed and stressed because of her many responsibilities. Intervention:  I introduced myself and my title as  I offered space for pt  to express feelings, needs, and concerns and provided a ministry presence. This writer actively listened to patient and provided emotional support. This writer encouraged patient in the practice of self-care. This writer also offered a prayer for pt. Outcome:  Patient expressed gratitude for this  visit     Plan:  Chaplains will remain available to offer spiritual and emotional support as needed. Electronically signed by Keely Cuadra, on 7/27/2023 at 11:17 AM.  [address] [place] [address] Drive -        07/27/23 1114   Encounter Summary   Service Provided For: Patient   Referral/Consult From: [address] Family members   Last Encounter  07/27/23   Complexity of Encounter Moderate   Begin Time 1050   End Time  1110   Total Time Calculated 20 min   Encounter    Type Initial Screen/Assessment   Spiritual/Emotional needs   Type Spiritual Support   Assessment/Intervention/Outcome   Assessment Anxious; Powerlessness;Stress overload   Intervention Active listening;Sustaining Presence/Ministry of presence;Prayer (assurance of)/[place];Explored/Affirmed feelings, thoughts, concerns; Discussed belief

## 2023-07-29 LAB
ANA SER QL IA: NEGATIVE
B BURGDOR IGG SER QL IB: NEGATIVE
B BURGDOR IGM SER QL IB: NEGATIVE
DSDNA IGG SER QL IA: 0.6 IU/ML
NUCLEAR IGG SER IA-RTO: 0.3 U/ML

## 2023-08-01 LAB — LYME ANTIBODY: 0.35

## 2023-08-15 ENCOUNTER — HOSPITAL ENCOUNTER (EMERGENCY)
Age: 68
Discharge: HOME | End: 2023-08-15
Attending: EMERGENCY MEDICINE
Payer: COMMERCIAL

## 2023-08-15 VITALS
SYSTOLIC BLOOD PRESSURE: 188 MMHG | DIASTOLIC BLOOD PRESSURE: 84 MMHG | OXYGEN SATURATION: 98 % | TEMPERATURE: 98.8 F | RESPIRATION RATE: 16 BRPM | HEART RATE: 62 BPM

## 2023-08-15 DIAGNOSIS — I10 ESSENTIAL HYPERTENSION: Primary | ICD-10-CM

## 2023-08-15 LAB
ANION GAP SERPL CALCULATED.3IONS-SCNC: 11 MMOL/L (ref 9–17)
BASOPHILS # BLD: 0.01 K/UL (ref 0–0.2)
BASOPHILS NFR BLD: 0 % (ref 0–2)
BUN SERPL-MCNC: 18 MG/DL (ref 8–23)
CALCIUM SERPL-MCNC: 9.6 MG/DL (ref 8.6–10.4)
CHLORIDE SERPL-SCNC: 106 MMOL/L (ref 98–107)
CO2 SERPL-SCNC: 26 MMOL/L (ref 20–31)
CREAT SERPL-MCNC: 0.8 MG/DL (ref 0.5–0.9)
EOSINOPHIL # BLD: 0.22 K/UL (ref 0–0.4)
EOSINOPHILS RELATIVE PERCENT: 3 % (ref 1–4)
ERYTHROCYTE [DISTWIDTH] IN BLOOD BY AUTOMATED COUNT: 14.1 % (ref 12.5–15.4)
GFR SERPL CREATININE-BSD FRML MDRD: >60 ML/MIN/1.73M2
GLUCOSE SERPL-MCNC: 102 MG/DL (ref 70–99)
HCT VFR BLD AUTO: 43.5 % (ref 36–46)
HGB BLD-MCNC: 13.2 G/DL (ref 12–16)
LYMPHOCYTES NFR BLD: 1.61 K/UL (ref 1–4.8)
LYMPHOCYTES RELATIVE PERCENT: 21 % (ref 24–44)
MCH RBC QN AUTO: 28.3 PG (ref 26–34)
MCHC RBC AUTO-ENTMCNC: 30.3 G/DL (ref 31–37)
MCV RBC AUTO: 93.1 FL (ref 80–100)
MONOCYTES NFR BLD: 0.54 K/UL (ref 0.1–1.2)
MONOCYTES NFR BLD: 7 % (ref 2–11)
NEUTROPHILS NFR BLD: 69 % (ref 36–66)
NEUTS SEG NFR BLD: 5.44 K/UL (ref 1.8–7.7)
PLATELET # BLD AUTO: 186 K/UL (ref 140–450)
PMV BLD AUTO: 10.1 FL (ref 8–14)
POTASSIUM SERPL-SCNC: 4.1 MMOL/L (ref 3.7–5.3)
RBC # BLD AUTO: 4.67 M/UL (ref 4–5.2)
REASON FOR REJECTION: NORMAL
SODIUM SERPL-SCNC: 143 MMOL/L (ref 135–144)
SPECIMEN SOURCE: NORMAL
TROPONIN I SERPL HS-MCNC: <6 NG/L (ref 0–14)
WBC OTHER # BLD: 7.8 K/UL (ref 3.5–11)
ZZ NTE CLEAN UP: ORDERED TEST: NORMAL

## 2023-08-15 PROCEDURE — 2580000003 HC RX 258: Performed by: PHYSICIAN ASSISTANT

## 2023-08-15 PROCEDURE — 80048 BASIC METABOLIC PNL TOTAL CA: CPT

## 2023-08-15 PROCEDURE — 36415 COLL VENOUS BLD VENIPUNCTURE: CPT

## 2023-08-15 PROCEDURE — 99284 EMERGENCY DEPT VISIT MOD MDM: CPT

## 2023-08-15 PROCEDURE — 84484 ASSAY OF TROPONIN QUANT: CPT

## 2023-08-15 PROCEDURE — 85025 COMPLETE CBC W/AUTO DIFF WBC: CPT

## 2023-08-15 RX ORDER — 0.9 % SODIUM CHLORIDE 0.9 %
500 INTRAVENOUS SOLUTION INTRAVENOUS ONCE
Status: COMPLETED | OUTPATIENT
Start: 2023-08-15 | End: 2023-08-15

## 2023-08-15 RX ADMIN — SODIUM CHLORIDE 500 ML: 9 INJECTION, SOLUTION INTRAVENOUS at 14:56

## 2023-08-15 ASSESSMENT — PAIN - FUNCTIONAL ASSESSMENT: PAIN_FUNCTIONAL_ASSESSMENT: NONE - DENIES PAIN

## 2023-08-15 NOTE — DISCHARGE INSTRUCTIONS
Continue taking your home medication. Monitor your BP daily. Hydrate well. PLEASE RETURN TO THE EMERGENCY DEPARTMENT IMMEDIATELY if your symptoms worsen in anyway or in 8-12 hours if not improved for re-evaluation. You should immediately return to the ER for symptoms such as increasing pain, bloody stool, fever, a feeling of passing out, light headed, dizziness, chest pain, shortness of breath, persistent nausea and/or vomiting, numbness or weakness to the arms or legs, coolness or color change of the arms or legs. Please understand that at this time there is no evidence for a more serious underlying process, but that early in the process of an illness or injury, an emergency department workup can be falsely reassuring. You should contact your family doctor within the next 24 hours for a follow up appointment    [facility]!!!    From [facility] and [facility] Emergency Services    On behalf of the Emergency Department staff at [employer], I would like to thank you for giving us the opportunity to address your health care needs and concerns. We hope that during your visit, our service was delivered in a professional and caring manner. Please keep [facility] in mind as we walk with you down the path to your own personal wellness. Please expect an automated text message or email from us so we can ask a few questions about your health and progress. Based on your answers, a clinician may call you back to offer help and instructions. Please understand that early in the process of an illness or injury, an emergency department workup can be falsely reassuring. If you notice any worsening, changing or persistent symptoms please call your family doctor or return to the ER immediately. Tell us how we did during your visit at [email or web address]   and let us know about your experience

## 2023-08-15 NOTE — ED PROVIDER NOTES
Attending Supervising Physician's Attestation Statement  I performed a history and physical examination on the patient and discussed the management with the nurse practitioner. I reviewed and agree with the findings and plan as documented in her note .     Patient is here with complaint of lightheadedness and high blood pressure patient states she was here last month was diagnosed with hypertension however she has not seen a physician well over 10 years prior to that states that she has been very stressed at home with a lot of work she started feeling lightheaded no chest pain or shortness breath numbness tingling weakness objective blood pressure is elevated rechecked it was even higher so she is here for evaluation she denies any complaints at this time  On exam patient has no focal neurological findings    Patient has no focal neurologic deficits no indication of acute neurological event bacterial infection EKG interpreted by me reveals minor sinus bradycardia at 58 no acute ST elevation pression normal UT interval normal QS complex she will be discharged with follow-up as directed return if worse  Condition at discharge stable  Diagnosis hypertension lightheaded    Electronically signed by Sophie Frederick MD on 8/15/23 at 3:52 PM EDT       Sophie Frederick MD  08/15/23 3099

## 2023-08-15 NOTE — ED NOTES
Resting quietly - states feeling same. Pleasant and cooperative. ECG completed and IV fluids started.      Marcello Magaña RN  08/15/23 5845

## 2023-08-15 NOTE — ED NOTES
Pt states under stress w/ caring for mother - mother was up during night couple times last night     Zelalem Cochran RN  08/15/23 7989

## 2023-08-16 LAB
EKG ATRIAL RATE: 58 BPM
EKG P AXIS: 56 DEGREES
EKG P-R INTERVAL: 194 MS
EKG Q-T INTERVAL: 418 MS
EKG QRS DURATION: 70 MS
EKG QTC CALCULATION (BAZETT): 410 MS
EKG R AXIS: 24 DEGREES
EKG T AXIS: 45 DEGREES
EKG VENTRICULAR RATE: 58 BPM

## 2023-08-16 NOTE — ED PROVIDER NOTES
[facility] Emergency Department  [id number] [facility]. [facility] [address]  Phone: [phone]  Fax: [phone]        Pt Name: Henrique Encarnacion  MRN: [id number]  [address] 1955  Date of evaluation: 8/15/23    [address]       Chief Complaint   Patient presents with    Dizziness     Checked BP at home elevated x couple readings - up to 200/systolic. C/o dizziness. Denies SOB or chest pressure       HISTORY OF PRESENT ILLNESS (Location/Symptom, Timing/Onset, Context/Setting, Quality, Duration, Modifying Factors, Severity)      Henrique Encarnacion is a 70 y.o. female with no pertinent PMH who presents to the ED via private auto with HTN and lightheadedness. Patient reports that she does not see a doctor regularly and she recently was admitted for near syncopal event. She has not been bradycardic and hypertensive. She had an extensive work-up and was started on losartan as a result of that visit and states that she has been taking it as prescribed but has been also checking her blood pressures and they have been high. She states that today she had a reading of over 979 systolic. She states that since yesterday she has been feeling lightheaded. Denies a room spinning sensation and reports it was definitely similar to her near syncopal event but nowhere near as severe. Patient relays that she has a significant amount of stress involving care of family and living situations. She was feeling very stressed over the last couple days in particular. Denies any fever, chills, recent illness, chest pain, shortness of breath, syncope, abdominal pain, nausea, vomiting, or any other concerns at this time. PAST MEDICAL / SURGICAL / SOCIAL / FAMILY HISTORY     PMH:  has a past medical history of Bursitis of shoulder, CAD (coronary artery disease), Myositis, and Polymyalgia ([address]). Surgical History:  has no past surgical history on file. Social History:  reports that she has never smoked.

## 2023-08-18 ENCOUNTER — OFFICE VISIT (OUTPATIENT)
Age: 68
End: 2023-08-18
Payer: COMMERCIAL

## 2023-08-18 VITALS
BODY MASS INDEX: 24.77 KG/M2 | DIASTOLIC BLOOD PRESSURE: 65 MMHG | TEMPERATURE: 98.5 F | HEART RATE: 77 BPM | RESPIRATION RATE: 16 BRPM | HEIGHT: 61 IN | WEIGHT: 131.2 LBS | SYSTOLIC BLOOD PRESSURE: 135 MMHG

## 2023-08-18 DIAGNOSIS — R21 SKIN RASH: ICD-10-CM

## 2023-08-18 DIAGNOSIS — R73.9 HIGH BLOOD SUGAR: Primary | ICD-10-CM

## 2023-08-18 DIAGNOSIS — I10 HYPERTENSION, UNSPECIFIED TYPE: ICD-10-CM

## 2023-08-18 DIAGNOSIS — Z09 HOSPITAL DISCHARGE FOLLOW-UP: ICD-10-CM

## 2023-08-18 DIAGNOSIS — E03.9 HYPOTHYROIDISM, UNSPECIFIED TYPE: ICD-10-CM

## 2023-08-18 DIAGNOSIS — I44.0 FIRST DEGREE AV BLOCK: ICD-10-CM

## 2023-08-18 DIAGNOSIS — Z12.11 COLON CANCER SCREENING: ICD-10-CM

## 2023-08-18 DIAGNOSIS — Z13.220 SCREENING FOR LIPID DISORDERS: ICD-10-CM

## 2023-08-18 PROCEDURE — 1123F ACP DISCUSS/DSCN MKR DOCD: CPT

## 2023-08-18 PROCEDURE — 1111F DSCHRG MED/CURRENT MED MERGE: CPT

## 2023-08-18 PROCEDURE — 3074F SYST BP LT 130 MM HG: CPT

## 2023-08-18 PROCEDURE — 3078F DIAST BP <80 MM HG: CPT

## 2023-08-18 PROCEDURE — 99204 OFFICE O/P NEW MOD 45 MIN: CPT

## 2023-08-18 SDOH — ECONOMIC STABILITY: HOUSING INSECURITY
IN THE LAST 12 MONTHS, WAS THERE A TIME WHEN YOU DID NOT HAVE A STEADY PLACE TO SLEEP OR SLEPT IN A SHELTER (INCLUDING NOW)?: NO

## 2023-08-18 SDOH — ECONOMIC STABILITY: FOOD INSECURITY: WITHIN THE PAST 12 MONTHS, YOU WORRIED THAT YOUR FOOD WOULD RUN OUT BEFORE YOU GOT MONEY TO BUY MORE.: NEVER TRUE

## 2023-08-18 SDOH — HEALTH STABILITY: PHYSICAL HEALTH: ON AVERAGE, HOW MANY MINUTES DO YOU ENGAGE IN EXERCISE AT THIS LEVEL?: 120 MIN

## 2023-08-18 SDOH — ECONOMIC STABILITY: FOOD INSECURITY: WITHIN THE PAST 12 MONTHS, THE FOOD YOU BOUGHT JUST DIDN'T LAST AND YOU DIDN'T HAVE MONEY TO GET MORE.: NEVER TRUE

## 2023-08-18 SDOH — ECONOMIC STABILITY: HOUSING INSECURITY: IN THE LAST 12 MONTHS, HOW MANY PLACES HAVE YOU LIVED?: 1

## 2023-08-18 SDOH — ECONOMIC STABILITY: INCOME INSECURITY: IN THE LAST 12 MONTHS, WAS THERE A TIME WHEN YOU WERE NOT ABLE TO PAY THE MORTGAGE OR RENT ON TIME?: NO

## 2023-08-18 SDOH — ECONOMIC STABILITY: INCOME INSECURITY: HOW HARD IS IT FOR YOU TO PAY FOR THE VERY BASICS LIKE FOOD, HOUSING, MEDICAL CARE, AND HEATING?: NOT HARD AT ALL

## 2023-08-18 SDOH — HEALTH STABILITY: PHYSICAL HEALTH: ON AVERAGE, HOW MANY DAYS PER WEEK DO YOU ENGAGE IN MODERATE TO STRENUOUS EXERCISE (LIKE A BRISK WALK)?: 2 DAYS

## 2023-08-18 SDOH — ECONOMIC STABILITY: TRANSPORTATION INSECURITY
IN THE PAST 12 MONTHS, HAS THE LACK OF TRANSPORTATION KEPT YOU FROM MEDICAL APPOINTMENTS OR FROM GETTING MEDICATIONS?: NO

## 2023-08-18 SDOH — ECONOMIC STABILITY: TRANSPORTATION INSECURITY
IN THE PAST 12 MONTHS, HAS LACK OF TRANSPORTATION KEPT YOU FROM MEETINGS, WORK, OR FROM GETTING THINGS NEEDED FOR DAILY LIVING?: NO

## 2023-08-18 ASSESSMENT — PATIENT HEALTH QUESTIONNAIRE - PHQ9
SUM OF ALL RESPONSES TO PHQ QUESTIONS 1-9: 0
SUM OF ALL RESPONSES TO PHQ QUESTIONS 1-9: 0
2. FEELING DOWN, DEPRESSED OR HOPELESS: 0
SUM OF ALL RESPONSES TO PHQ QUESTIONS 1-9: 0
SUM OF ALL RESPONSES TO PHQ9 QUESTIONS 1 & 2: 0
SUM OF ALL RESPONSES TO PHQ QUESTIONS 1-9: 0
1. LITTLE INTEREST OR PLEASURE IN DOING THINGS: 0

## 2023-08-18 ASSESSMENT — SOCIAL DETERMINANTS OF HEALTH (SDOH)
WITHIN THE LAST YEAR, HAVE YOU BEEN HUMILIATED OR EMOTIONALLY ABUSED IN OTHER WAYS BY YOUR PARTNER OR EX-PARTNER?: NO
WITHIN THE LAST YEAR, HAVE YOU BEEN KICKED, HIT, SLAPPED, OR OTHERWISE PHYSICALLY HURT BY YOUR PARTNER OR EX-PARTNER?: NO
WITHIN THE LAST YEAR, HAVE YOU BEEN AFRAID OF YOUR PARTNER OR EX-PARTNER?: NO
WITHIN THE LAST YEAR, HAVE TO BEEN RAPED OR FORCED TO HAVE ANY KIND OF SEXUAL ACTIVITY BY YOUR PARTNER OR EX-PARTNER?: NO

## 2023-08-18 ASSESSMENT — LIFESTYLE VARIABLES
HOW MANY STANDARD DRINKS CONTAINING ALCOHOL DO YOU HAVE ON A TYPICAL DAY: PATIENT DOES NOT DRINK
HOW OFTEN DO YOU HAVE A DRINK CONTAINING ALCOHOL: NEVER

## 2023-08-18 NOTE — PROGRESS NOTES
Angiotensin-Converting Enzyme 07/27/2023 27  8 - 52 U/L Final    Glucose 07/27/2023 106 (H)  70 - 99 mg/dL Final    BUN 07/27/2023 14  8 - 23 mg/dL Final    Creatinine 07/27/2023 0.9  0.5 - 0.9 mg/dL Final    Est, Glom Filt Rate 07/27/2023 >60  >60 mL/min/1.73m2 Final    Comment:       These results are not intended for use in patients <25years of age. eGFR results are calculated without a race factor using the 2021 CKD-EPI equation. Careful clinical correlation is recommended, particularly when comparing to results   calculated using previous equations. The CKD-EPI equation is less accurate in patients with extremes of muscle mass, extra-renal   metabolism of creatine, excessive creatine ingestion, or following therapy that affects   renal tubular secretion.       Calcium 07/27/2023 8.8  8.6 - 10.4 mg/dL Final    Sodium 07/27/2023 140  135 - 144 mmol/L Final    Potassium 07/27/2023 3.9  3.7 - 5.3 mmol/L Final    Chloride 07/27/2023 106  98 - 107 mmol/L Final    CO2 07/27/2023 26  20 - 31 mmol/L Final    Anion Gap 07/27/2023 8 (L)  9 - 17 mmol/L Final    Alkaline Phosphatase 07/27/2023 71  35 - 104 U/L Final    ALT 07/27/2023 7  5 - 33 U/L Final    AST 07/27/2023 13  <32 U/L Final    Total Bilirubin 07/27/2023 0.5  0.3 - 1.2 mg/dL Final    Total Protein 07/27/2023 6.4  6.4 - 8.3 g/dL Final    Albumin 07/27/2023 3.5  3.5 - 5.2 g/dL Final    Albumin/Globulin Ratio 07/27/2023 1.2  1.0 - 2.5 Final    WBC 07/27/2023 5.9  3.5 - 11.0 k/uL Final    RBC 07/27/2023 4.17  4.0 - 5.2 m/uL Final    Hemoglobin 07/27/2023 12.3  12.0 - 16.0 g/dL Final    Hematocrit 07/27/2023 37.1  36 - 46 % Final    MCV 07/27/2023 89.1  80 - 100 fL Final    MCH 07/27/2023 29.6  26 - 34 pg Final    MCHC 07/27/2023 33.2  31 - 37 g/dL Final    RDW 07/27/2023 13.7  12.5 - 15.4 % Final    Platelets 19/03/5369 201  140 - 450 k/uL Final    MPV 07/27/2023 8.1  6.0 - 12.0 fL Final    Neutrophils % 07/27/2023 48  36 - 66 % Final    Lymphocytes %

## 2023-08-18 NOTE — PATIENT INSTRUCTIONS
Thank you for following up with us at [facility] outpatient residency clinic! It was a pleasure to meet you today! Our plan is the following:  - Please follow up with cardiology   - apply hydrocortisone on skin rash   - Continue taking Losartan and monitoring BP at home with the log.   -please get your labs done as soon as possible and the cologuard   - I will see you in 6 weeks       If you have any additional questions or concerns, please call the office ([phone]) and speak to one of the staff. They will triage and forward the message to the doctors! Have a great rest of your day!

## 2023-08-21 ASSESSMENT — ENCOUNTER SYMPTOMS
DIARRHEA: 0
NAUSEA: 0
CHEST TIGHTNESS: 0
WHEEZING: 0
SHORTNESS OF BREATH: 0
VOMITING: 0
ABDOMINAL PAIN: 0

## 2023-08-22 ENCOUNTER — HOSPITAL ENCOUNTER (OUTPATIENT)
Age: 68
Setting detail: SPECIMEN
Discharge: HOME | End: 2023-08-22

## 2023-08-22 DIAGNOSIS — E03.9 HYPOTHYROIDISM, UNSPECIFIED TYPE: ICD-10-CM

## 2023-08-22 DIAGNOSIS — Z13.220 SCREENING FOR LIPID DISORDERS: ICD-10-CM

## 2023-08-22 DIAGNOSIS — I44.0 FIRST DEGREE AV BLOCK: ICD-10-CM

## 2023-08-22 DIAGNOSIS — R73.9 HIGH BLOOD SUGAR: ICD-10-CM

## 2023-08-23 ENCOUNTER — TELEPHONE (OUTPATIENT)
Age: 68
End: 2023-08-23

## 2023-08-23 LAB
CHOLEST SERPL-MCNC: 236 MG/DL
CHOLESTEROL/HDL RATIO: 4.1
ECHO BSA: 1.63 M2
EST. AVERAGE GLUCOSE BLD GHB EST-MCNC: 131 MG/DL
HBA1C MFR BLD: 6.2 % (ref 4–6)
HDLC SERPL-MCNC: 57 MG/DL
LDLC SERPL CALC-MCNC: 154 MG/DL (ref 0–130)
TRIGL SERPL-MCNC: 123 MG/DL
TSH SERPL DL<=0.05 MIU/L-ACNC: 2.97 UIU/ML (ref 0.3–5)

## 2023-08-23 NOTE — TELEPHONE ENCOUNTER
----- Message from Pola Parada MD sent at 8/23/2023 10:54 AM EDT -----  Regarding: cologuard  Good morning, patient Monica Crowder, I got off the phone with her just now and she mention that she isn't able to get the cologuard because they require more information. I am not to sure what extra they need or if they called the office by any chance.      Pola Parada MD

## 2023-08-23 NOTE — TELEPHONE ENCOUNTER
----- Message from Orville Trejo MD sent at 8/23/2023 10:54 AM EDT -----  Regarding: cologuard  Good morning, patient Jose Morrison, I got off the phone with her just now and she mention that she isn't able to get the cologuard because they require more information. I am not to sure what extra they need or if they called the office by any chance.      Orville Trejo MD

## 2023-08-23 NOTE — TELEPHONE ENCOUNTER
Dr Padmini Bryan with the [employer]/ cologard people and they have you as not a Medicare provider so options are: an attending or other physician put in new order under their name, yours has to be cancelled, make sure it is cancelled or it will come over as a duplicate and they will disregard  the new order, patient could pay out of pocket which it's like 600.00 so that's not an option or you can become a Medicare provider!!  Please advise next step-  Thanks!

## 2023-08-24 DIAGNOSIS — Z12.11 COLON CANCER SCREENING: Primary | ICD-10-CM

## 2023-08-24 NOTE — PROGRESS NOTES
Order Only Note --    A new order for Cologuard screening was placed for this patient by the request of her PCP, Dr. Deanna Montalvo.   A new order was required for Medicaid approval.      Kadeem Guaman DO

## 2023-08-24 NOTE — TELEPHONE ENCOUNTER
LM for pt letting her know to expect kit but if she doees run into any other problems to let us know-

## 2023-09-12 ENCOUNTER — OFFICE VISIT (OUTPATIENT)
Age: 68
End: 2023-09-12

## 2023-09-12 VITALS
BODY MASS INDEX: 24.36 KG/M2 | HEART RATE: 68 BPM | DIASTOLIC BLOOD PRESSURE: 87 MMHG | RESPIRATION RATE: 16 BRPM | WEIGHT: 130 LBS | TEMPERATURE: 98.2 F | SYSTOLIC BLOOD PRESSURE: 174 MMHG

## 2023-09-12 DIAGNOSIS — I10 HYPERTENSION, UNSPECIFIED TYPE: Primary | ICD-10-CM

## 2023-09-12 RX ORDER — LOSARTAN POTASSIUM 100 MG/1
100 TABLET ORAL DAILY
Qty: 30 TABLET | Refills: 1 | Status: SHIPPED | OUTPATIENT
Start: 2023-09-12

## 2023-09-12 ASSESSMENT — ENCOUNTER SYMPTOMS
ABDOMINAL PAIN: 0
DIARRHEA: 0
CONSTIPATION: 0
NAUSEA: 0
COUGH: 0
VOMITING: 0
SHORTNESS OF BREATH: 0
PHOTOPHOBIA: 0

## 2023-09-12 NOTE — PATIENT INSTRUCTIONS
Follow up in 1 week BP   Will be increasing Losartan 100mg daily   Please  medication from pharmacy  Please get a new blood pressure cuff and take blood pressure readings 3 times per day   Continue taking a log of your blood pressure medications  If your blood pressure goes above 594 systolic (top number) and 725 diastolic (bottom number) call office   Thank you for your visit today !

## 2023-09-12 NOTE — PROGRESS NOTES
Extraocular Movements: Extraocular movements intact. Conjunctiva/sclera: Conjunctivae normal.   Cardiovascular:      Rate and Rhythm: Normal rate and regular rhythm. Pulses: Normal pulses. Heart sounds: Normal heart sounds. No murmur heard. No friction rub. No gallop. Pulmonary:      Effort: Pulmonary effort is normal.      Breath sounds: Normal breath sounds. No wheezing, rhonchi or rales. Musculoskeletal:      Right lower leg: No edema. Left lower leg: No edema. Neurological:      Mental Status: She is alert. Psychiatric:         Mood and Affect: Mood normal.         Behavior: Behavior normal.         ASSESSMENT/PLAN     1. Hypertension, unspecified type  -     losartan (COZAAR) 100 MG tablet; Take 1 tablet by mouth daily, Disp-30 tablet, R-1Normal   Follow up in 1 week BP   Will be increasing Losartan 100mg daily   Please  medication from pharmacy  Please get a new blood pressure cuff and take blood pressure readings 3 times per day   Continue taking a log of your blood pressure medications  If your blood pressure goes above 255 systolic (top number) and 842 diastolic (bottom number) call office  Rechecked blood pressure which did increase discuss told patient to take a second 50mg tablet of losartan tonight and start the 100mg tablets tomorrow. She will check her blood pressure at home and let us know. Return in about 1 week (around 9/19/2023) for BP check. - Questions/concerns answered. Patient verbalized and expressed understanding. Medications, laboratory testing, imaging, consultation, and follow up as documented in this encounter.      Please be aware portions of this note were completed using voice recognition software and unforeseen errors may have occurred    Patient was discussed with Dr. Jj Vidal    Electronically signed by Willie Smith MD on 9/12/2023 at 4:37 PM

## 2023-09-13 RX ORDER — LOSARTAN POTASSIUM 50 MG/1
50 TABLET ORAL DAILY
Qty: 30 TABLET | Refills: 0 | OUTPATIENT
Start: 2023-09-13

## 2023-09-19 ENCOUNTER — OFFICE VISIT (OUTPATIENT)
Age: 68
End: 2023-09-19
Payer: COMMERCIAL

## 2023-09-19 VITALS
WEIGHT: 130 LBS | SYSTOLIC BLOOD PRESSURE: 123 MMHG | HEIGHT: 62 IN | RESPIRATION RATE: 16 BRPM | BODY MASS INDEX: 23.92 KG/M2 | DIASTOLIC BLOOD PRESSURE: 60 MMHG | HEART RATE: 70 BPM

## 2023-09-19 DIAGNOSIS — I10 PRIMARY HYPERTENSION: Primary | ICD-10-CM

## 2023-09-19 PROCEDURE — 1123F ACP DISCUSS/DSCN MKR DOCD: CPT | Performed by: FAMILY MEDICINE

## 2023-09-19 PROCEDURE — 3074F SYST BP LT 130 MM HG: CPT | Performed by: FAMILY MEDICINE

## 2023-09-19 PROCEDURE — 99211 OFF/OP EST MAY X REQ PHY/QHP: CPT

## 2023-09-19 PROCEDURE — 99213 OFFICE O/P EST LOW 20 MIN: CPT | Performed by: FAMILY MEDICINE

## 2023-09-19 PROCEDURE — 3078F DIAST BP <80 MM HG: CPT | Performed by: FAMILY MEDICINE

## 2023-09-19 NOTE — PROGRESS NOTES
[address] [facility]. S.W Family Medicine Residency  [address] [facility], [address]   Phone: [phone]  Fax: [phone]      Date of Visit:  2023  Patient Name: Denisse Greene   Patient :  1955     Chief Complaint   Patient presents with    Hypertension     F/u BP-did not bring log, 157/87, 148/83 earlier today, took 50 mg today instead of 100. .. HPI:     Denisse Greene is a 70 y.o. female who presents today to discuss blood pressure. She was recently seen here on 23 where she had been having elevated blood pressure readings at home she was advised to increase her losartan to 100mg daily and continue to check blood pressure with a new BP cuff. Still needs new BP cuff and has been using old BP cuff which when compared to ours was higher, she states that she has compared with neighbors and appears to be the same. She states that she has signed up for a free BP cuff that she will get in a couple weeks. Has been taking blood pressure medications as more of an as needed medication. Sleeping habit not great 4am-9am  Has a lot of life stressors right now that she states is causing her blood pressure to increase   Discussed taking the medication everyday even when her blood pressure is good because that means it is working, she should not take the medication if her blood presssure is <801 systolic or HR < 60. She is in understanding of this. Reviewed lipid panel with her and discussed starting a statin, which she is not wanting to do at that at this time. She states that there are supplements that she is taking that is meant to help decrease cholesterol and told her to prevent all supplements that she is taking along with her CBD Gummies so that we can make sure there are no drug interaction. She states that she will bring them in to next visit to discuss if they are okay. Otherwise no acute concerns at this time.      I personally reviewed the patient's past

## 2023-09-19 NOTE — PATIENT INSTRUCTIONS
Follow up with Dr. Lee Chacon on 9/29/23  Continue to take 100mg Losartan everyday   Please continue checking your blood pressure daily  If you can keep a log of blood pressure that would be great  If your blood pressure ever goes less than 586 systolic or your Heart rate is less than 60 please hold medication and call us   If you become symptomatic with lightheadedness, dizziness, passing out, unstable gait please call us. Please bring in all supplements to the next visit   Thank you for your visit!

## 2023-09-21 ASSESSMENT — ENCOUNTER SYMPTOMS
WHEEZING: 0
ABDOMINAL PAIN: 0
SHORTNESS OF BREATH: 0
COUGH: 0
DIARRHEA: 0
CONSTIPATION: 0

## 2023-09-29 ENCOUNTER — OFFICE VISIT (OUTPATIENT)
Age: 68
End: 2023-09-29
Payer: COMMERCIAL

## 2023-09-29 VITALS
SYSTOLIC BLOOD PRESSURE: 158 MMHG | TEMPERATURE: 97.9 F | DIASTOLIC BLOOD PRESSURE: 74 MMHG | BODY MASS INDEX: 24.33 KG/M2 | HEART RATE: 62 BPM | HEIGHT: 62 IN | WEIGHT: 132.2 LBS | RESPIRATION RATE: 16 BRPM | OXYGEN SATURATION: 96 %

## 2023-09-29 DIAGNOSIS — Z78.9 NONSMOKER: ICD-10-CM

## 2023-09-29 DIAGNOSIS — E78.00 HYPERCHOLESTEREMIA: ICD-10-CM

## 2023-09-29 DIAGNOSIS — R73.03 PREDIABETES: ICD-10-CM

## 2023-09-29 DIAGNOSIS — I10 HYPERTENSION, UNSPECIFIED TYPE: Primary | ICD-10-CM

## 2023-09-29 PROCEDURE — 99211 OFF/OP EST MAY X REQ PHY/QHP: CPT

## 2023-09-29 NOTE — PROGRESS NOTES
[address] [facility]. S.W Family Medicine Residency  [address] [facility], [address] [facility]  Phone: [phone]  Fax: [phone]      Date of Visit: .TD  Patient Name: Norah Leblanc   Patient :  1955     ASSESSMENT/PLAN   1. Hypertension, unspecified type   -BP log reports hypertension stage 2   - Continue taking Losartan 100 mg daily   - Patient has appointment with cardiologist on Oct 6, for 1st degree AV block, advised patient to take BP log to visit so they can manage if needed her BP medication regimen    - Patient reports taking BP everyday multiple times, was advised to take blood pressure every other day, once around lunch time. Take more then once only when symptomatic or has a concern. - Will continue to monitor BP and adjust medication if needed. 2. Prediabetes   - Reviewed labs with patient: A1C reported 6.2, patient hesitant to start medications, so was advised to start with lifestyle modifications. - Articles for diet and modifications were attached with discharge summary   - Patient has 3 months to improve, will repeat labs in 3 months. 3. Hypercholesteremia    -Labs reviewed with patient showed: Total cholesterol 236,    - Patient will try first lifestyle modifications before starting medication    - Advised patient to stay away from fried foods and foods heavy on cholesterol.    - Advised patient to stay active and increase exercise weekly   - will repeat labs in 3 months     Will follow up with patient if there any concerns with her cologuard results, or concerns after her cardiology office. Return in about 3 months (around 2023) for repeat labs, HTN .     HPI    Norah Leblanc is a 70 y.o. female who presents today to discuss   Chief Complaint   Patient presents with    Discuss Labs    Follow-up     Patient with PMHx of high blood sugar, HTN, first degree AV block, bursitis of right shoulder, myositis, and polymyalgia was seen

## 2023-09-29 NOTE — PATIENT INSTRUCTIONS
Thank you for following up with us at [facility] outpatient residency clinic! It was a pleasure to meet you today! Our plan is the following:  - Please follow up with cardiology appointment I will be looking for their note  - Please watch out for diet and advise to eat low sodium foods, DASH diet   - As we spoke measure blood pressure every other day, if you have symptoms you can take it, take log to cardiologist   - continue taking your medication  - I will see you in 3 months to repeat labs and see how the diet is going. If you have any additional questions or concerns, please call the office ([phone]) and speak to one of the staff. They will triage and forward the message to the doctors! Have a great rest of your day!

## 2023-10-02 PROBLEM — R73.03 PREDIABETES: Status: ACTIVE | Noted: 2023-10-02

## 2023-10-02 PROBLEM — E78.00 HYPERCHOLESTEREMIA: Status: ACTIVE | Noted: 2023-10-02

## 2023-10-02 ASSESSMENT — ENCOUNTER SYMPTOMS
WHEEZING: 0
ABDOMINAL PAIN: 0
CHEST TIGHTNESS: 0
CONSTIPATION: 0
COUGH: 0
VOMITING: 0
NAUSEA: 0
DIARRHEA: 0
SHORTNESS OF BREATH: 0

## 2023-12-29 ENCOUNTER — OFFICE VISIT (OUTPATIENT)
Age: 68
End: 2023-12-29
Payer: COMMERCIAL

## 2023-12-29 VITALS
HEART RATE: 74 BPM | RESPIRATION RATE: 16 BRPM | TEMPERATURE: 98.3 F | SYSTOLIC BLOOD PRESSURE: 135 MMHG | BODY MASS INDEX: 23.26 KG/M2 | HEIGHT: 62 IN | DIASTOLIC BLOOD PRESSURE: 57 MMHG | WEIGHT: 126.4 LBS

## 2023-12-29 DIAGNOSIS — I10 HYPERTENSION, UNSPECIFIED TYPE: ICD-10-CM

## 2023-12-29 DIAGNOSIS — E78.5 HYPERLIPIDEMIA, UNSPECIFIED HYPERLIPIDEMIA TYPE: ICD-10-CM

## 2023-12-29 DIAGNOSIS — R73.03 PREDIABETES: Primary | ICD-10-CM

## 2023-12-29 PROCEDURE — 3075F SYST BP GE 130 - 139MM HG: CPT

## 2023-12-29 PROCEDURE — 3078F DIAST BP <80 MM HG: CPT

## 2023-12-29 PROCEDURE — 1123F ACP DISCUSS/DSCN MKR DOCD: CPT

## 2023-12-29 PROCEDURE — 99213 OFFICE O/P EST LOW 20 MIN: CPT

## 2023-12-29 RX ORDER — LOSARTAN POTASSIUM 50 MG/1
50 TABLET ORAL DAILY
Qty: 30 TABLET | Refills: 1 | Status: SHIPPED | OUTPATIENT
Start: 2023-12-29 | End: 2024-02-27

## 2023-12-29 RX ORDER — LOSARTAN POTASSIUM 100 MG/1
100 TABLET ORAL DAILY
Qty: 30 TABLET | Refills: 1 | Status: CANCELLED | OUTPATIENT
Start: 2023-12-29

## 2023-12-29 NOTE — PROGRESS NOTES
[facility] Family Medicine Residency  [address] [place] [address]  Phone: [phone]  Fax: [phone]      Date of Visit: .TD  Patient Name: Evelin Frazier   Patient :  1955     ASSESSMENT/PLAN   1. Hypertension, unspecified type   -BP log reports hypertension stage 2   - discontinued Losartan 100 mg and started Losartan 50 mg   - Patient is to log BP once she gets her new BP cuff at home   - Patient is to report to the office when starting a new supplements for extensive review and making sure supplement doesn't interact with current medication.    - will continue to monitor BP    - cardiology appointment on ; will follow up   2. Prediabetes   - 3 Months ago A1C reported 6.2; we discussed about lifestyle modifications and health eating   - Reordered A1C for 3 month follow up     3. Hypercholesteremia    - during visit 3 months ago Total cholesterol 236,    - we discussed about lifestyle modifications before starting medication    - Advised patient to stay away from fried foods and foods heavy on cholesterol.    - Advised patient to stay active and increase exercise weekly   - reordered lab for close monitoring     No follow-ups on file. Will follow up with patient after her annual wellness visit on .     HPI    Evelin Frazier is a 68 y.o. female who presents today to discuss   Chief Complaint   Patient presents with    Hypertension    Medication Refill     Losartin sent to [facility] in [place] - 90 day supply     Patient with PMHx of high blood sugar, HTN, first degree AV block, bursitis of right shoulder, myositis, and polymyalgia presents today to discuss BP. Patient reports taking medication as not prescribed. She reports that some days her BP will be around 120-135/60-75 and has fear if she takes her BP medication it will drop to low and place her in the hospital. Patient started taking some supplement and reports that has

## 2023-12-29 NOTE — PATIENT INSTRUCTIONS
Thank you for following up with us at [facility] outpatient residency clinic! It was a pleasure to meet you today!     Our plan is the following:  - sent your losartan 50 mg to the pharmacy   - please record your blood pressure well at home, I printed out instruction how to do it     If you have any additional questions or concerns, please call the office ([phone]) and speak to one of the staff. They will triage and forward the message to the doctors! Have a great rest of your day!

## 2024-01-29 ENCOUNTER — TELEPHONE (OUTPATIENT)
Age: 69
End: 2024-01-29

## 2024-01-29 ENCOUNTER — OFFICE VISIT (OUTPATIENT)
Age: 69
End: 2024-01-29
Payer: COMMERCIAL

## 2024-01-29 VITALS
HEIGHT: 62 IN | RESPIRATION RATE: 16 BRPM | DIASTOLIC BLOOD PRESSURE: 58 MMHG | BODY MASS INDEX: 23.55 KG/M2 | SYSTOLIC BLOOD PRESSURE: 145 MMHG | TEMPERATURE: 97.8 F | HEART RATE: 70 BPM | WEIGHT: 128 LBS

## 2024-01-29 DIAGNOSIS — Z00.00 MEDICARE ANNUAL WELLNESS VISIT, SUBSEQUENT: Primary | ICD-10-CM

## 2024-01-29 DIAGNOSIS — Z12.11 COLON CANCER SCREENING: Primary | ICD-10-CM

## 2024-01-29 PROCEDURE — 1123F ACP DISCUSS/DSCN MKR DOCD: CPT | Performed by: FAMILY MEDICINE

## 2024-01-29 PROCEDURE — G0439 PPPS, SUBSEQ VISIT: HCPCS | Performed by: FAMILY MEDICINE

## 2024-01-29 ASSESSMENT — LIFESTYLE VARIABLES
HOW MANY STANDARD DRINKS CONTAINING ALCOHOL DO YOU HAVE ON A TYPICAL DAY: PATIENT DOES NOT DRINK
HOW OFTEN DO YOU HAVE A DRINK CONTAINING ALCOHOL: NEVER
HOW MANY STANDARD DRINKS CONTAINING ALCOHOL DO YOU HAVE ON A TYPICAL DAY: PATIENT DOES NOT DRINK

## 2024-01-29 ASSESSMENT — PATIENT HEALTH QUESTIONNAIRE - PHQ9
SUM OF ALL RESPONSES TO PHQ QUESTIONS 1-9: 0
SUM OF ALL RESPONSES TO PHQ QUESTIONS 1-9: 0
SUM OF ALL RESPONSES TO PHQ9 QUESTIONS 1 & 2: 0
SUM OF ALL RESPONSES TO PHQ QUESTIONS 1-9: 0
1. LITTLE INTEREST OR PLEASURE IN DOING THINGS: 0
1. LITTLE INTEREST OR PLEASURE IN DOING THINGS: 0
SUM OF ALL RESPONSES TO PHQ9 QUESTIONS 1 & 2: 0
SUM OF ALL RESPONSES TO PHQ QUESTIONS 1-9: 0
2. FEELING DOWN, DEPRESSED OR HOPELESS: 0
SUM OF ALL RESPONSES TO PHQ QUESTIONS 1-9: 0
SUM OF ALL RESPONSES TO PHQ QUESTIONS 1-9: 0

## 2024-01-29 NOTE — TELEPHONE ENCOUNTER
RENNY Keene!  I saw Evelin for a AWV today.  Please see encounter for full details.  She is willing to get a DEXA if she does not have to lay down.  She is also willing to get a Cologuard and it looks like something happened with the Cologuard the last time it was ordered and it had to be cancelled.  See your previous notes.  Could you please place an order for both of those?  She also declined a mammogram today due to concerns for radiation.  Would you be able to discuss other options with her the next time you see her?  Thanks!

## 2024-01-29 NOTE — PROGRESS NOTES
Support ([employer])  Patient not taking: Reported on 1/29/2024  Provider, Historical, MD       CareTeam (Including outside providers/suppliers regularly involved in providing care):   Patient Care Team:  Levi Keene MD as PCP - General (Family Medicine)  Db Goins MD as Consulting Physician (Internal Medicine)     Reviewed and updated this visit:  Tobacco  Allergies  Meds  Med Hx  Surg Hx  Soc Hx  Fam Hx         Celena Osborn, PharmD Candidate was present for the visit and participated in the encounter.

## 2024-01-29 NOTE — PATIENT INSTRUCTIONS
irregular heartbeat.   After you call 911, the  may tell you to chew 1 adult-strength or 2 to 4 low-dose aspirin. Wait for an ambulance. Do not try to drive yourself.  Watch closely for changes in your health, and be sure to contact your doctor if you have any problems.  Where can you learn more?  Go to https://www.[employer].net/patientEd and enter F075 to learn more about \"A Healthy Heart: Care Instructions.\"  Current as of: June 25, 2023               Content Version: 13.9  © 6841-4706 [employer].   Care instructions adapted under license by [employer]. If you have questions about a medical condition or this instruction, always ask your healthcare professional. [employer] disclaims any warranty or liability for your use of this information.      Personalized Preventive Plan for Evelin Frazier - 1/29/2024  Medicare offers a range of preventive health benefits. Some of the tests and screenings are paid in full while other may be subject to a deductible, co-insurance, and/or copay.    Some of these benefits include a comprehensive review of your medical history including lifestyle, illnesses that may run in your family, and various assessments and screenings as appropriate.    After reviewing your medical record and screening and assessments performed today your provider may have ordered immunizations, labs, imaging, and/or referrals for you.  A list of these orders (if applicable) as well as your Preventive Care list are included within your After Visit Summary for your review.    Other Preventive Recommendations:    A preventive eye exam performed by an eye specialist is recommended every 1-2 years to screen for glaucoma; cataracts, macular degeneration, and other eye disorders.  A preventive dental visit is recommended every 6 months.  Try to get at least 150 minutes of exercise per week or 10,000 steps per day on a pedometer .  Order or download the FREE \"Exercise &

## 2024-01-30 ENCOUNTER — TELEPHONE (OUTPATIENT)
Age: 69
End: 2024-01-30

## 2024-01-30 NOTE — TELEPHONE ENCOUNTER
JESSICA--  Spoke to patient to let her know of upcoming cologuard test and what to expect.  Patient informed me that she was unable to get blood pressure cuff at this time r/t insurance and having to pay OTC.  She did not feel well last evening after eating a large salad and steak.  She went home and had several BM's- normal in consistency.   She also stated that she was feeling strange and went to [facility] to take her BP and it was 157/71  repeat was 147/74. AM today prior to taking medication her BP was 184/86. NO CP, dizziness or vision changes.  Encouraged Patient to come in today at 4pm, but she refused.  Stating \"I did not get any sleep last night and I am going to lay down.  I asked her to take her BP at 4 pm today and if higher than 140 SBP to call and get and appt with our office for tomorrow.  Pt. Verbalized understanding and verbalized s/s to look for and when to go to the ER.

## 2024-02-02 NOTE — TELEPHONE ENCOUNTER
Patient called back.  She is doing better.  She bought a BP cuff yesterday.  Took today and it was 136/72. She will continue to monitor over weekend.

## 2024-03-14 DIAGNOSIS — I10 HYPERTENSION, UNSPECIFIED TYPE: ICD-10-CM

## 2024-03-14 RX ORDER — LOSARTAN POTASSIUM 50 MG/1
50 TABLET ORAL DAILY
Qty: 30 TABLET | Refills: 1 | Status: SHIPPED | OUTPATIENT
Start: 2024-03-14 | End: 2024-05-13

## 2024-04-11 NOTE — PROGRESS NOTES
[facility] Family Medicine Residency  [address] [place] [address]  Phone: [phone]  Fax: [phone]      Date of Visit: .TD  Patient Name: Evelin Frazier   Patient :  1955     ASSESSMENT/PLAN   1. Encounter for hepatitis C screening test for low risk patient  -    Ordered Hepatitis C Antibody; Future for screening   2. Hypertension, unspecified type  - BP will controlled and patient reports no side effects. Will continue on same dosage.       - losartan (COZAAR) 50 MG tablet; Take 1 tablet by mouth daily, Disp-90 tablet, R-1Normal       Return in about 6 months (around 10/12/2024) for HTN.    HPI    Evelin Frazier is a 69 y.o. female who presents today to discuss   Chief Complaint   Patient presents with    Hypertension     Has not checked Bps at home   - gdo     Pt is here for a 3 month follow up on Hypertension. Per last visit patient was concerned that her BP would drop to much with Losartan 100 mg and she requested for dose reduction. In that visit I strongly had suggested that we continue with losartan 100 mg due to better BP control. Pt reported she was not taking it everyday if her BP was normal or in the 130s. As well patient started on some natural supplement that is suppose to decrease blood pressure and she's afraid it'll drop even more. We had discussed about the consequences of not having well controlled BP and compromised with decreasing losartan to 50 mg. Today BP is stable at 135/74, reports no side effects. Will continue patient on this dosage and continue to follow up. Patient is due for a mammogram which she refuses to do, do to fear of radiation. DEXA scan is due as well, but patient wants to revisit that next year for now.   Labs that are pending are HA1C, lipid, and cologuard. Patient today in office did her lab work and will get cologuard done as soon as possible. Will follow up with results.       I personally reviewed the

## 2024-04-12 ENCOUNTER — HOSPITAL ENCOUNTER (OUTPATIENT)
Age: 69
Setting detail: SPECIMEN
Discharge: HOME | End: 2024-04-12

## 2024-04-12 ENCOUNTER — OFFICE VISIT (OUTPATIENT)
Age: 69
End: 2024-04-12
Payer: COMMERCIAL

## 2024-04-12 VITALS
BODY MASS INDEX: 22.34 KG/M2 | DIASTOLIC BLOOD PRESSURE: 74 MMHG | HEIGHT: 62 IN | RESPIRATION RATE: 16 BRPM | WEIGHT: 121.4 LBS | SYSTOLIC BLOOD PRESSURE: 135 MMHG | TEMPERATURE: 97.6 F | HEART RATE: 69 BPM

## 2024-04-12 DIAGNOSIS — E78.5 HYPERLIPIDEMIA, UNSPECIFIED HYPERLIPIDEMIA TYPE: ICD-10-CM

## 2024-04-12 DIAGNOSIS — Z11.59 ENCOUNTER FOR HEPATITIS C SCREENING TEST FOR LOW RISK PATIENT: ICD-10-CM

## 2024-04-12 DIAGNOSIS — Z11.59 ENCOUNTER FOR HEPATITIS C SCREENING TEST FOR LOW RISK PATIENT: Primary | ICD-10-CM

## 2024-04-12 DIAGNOSIS — R73.03 PREDIABETES: ICD-10-CM

## 2024-04-12 DIAGNOSIS — I10 HYPERTENSION, UNSPECIFIED TYPE: ICD-10-CM

## 2024-04-12 LAB
CHOLEST SERPL-MCNC: 214 MG/DL (ref 0–199)
CHOLESTEROL/HDL RATIO: 4
EST. AVERAGE GLUCOSE BLD GHB EST-MCNC: 126 MG/DL
HBA1C MFR BLD: 6 % (ref 4–6)
HCV AB SERPL QL IA: NONREACTIVE
HDLC SERPL-MCNC: 54 MG/DL
LDLC SERPL CALC-MCNC: 136 MG/DL (ref 0–100)
TRIGL SERPL-MCNC: 120 MG/DL
VLDLC SERPL CALC-MCNC: 24 MG/DL

## 2024-04-12 PROCEDURE — 99213 OFFICE O/P EST LOW 20 MIN: CPT

## 2024-04-12 PROCEDURE — 99212 OFFICE O/P EST SF 10 MIN: CPT

## 2024-04-12 PROCEDURE — 3078F DIAST BP <80 MM HG: CPT

## 2024-04-12 PROCEDURE — 3075F SYST BP GE 130 - 139MM HG: CPT

## 2024-04-12 PROCEDURE — 1123F ACP DISCUSS/DSCN MKR DOCD: CPT

## 2024-04-12 RX ORDER — LOSARTAN POTASSIUM 50 MG/1
50 TABLET ORAL DAILY
Qty: 90 TABLET | Refills: 1 | Status: SHIPPED | OUTPATIENT
Start: 2024-04-12 | End: 2024-10-09

## 2024-04-12 NOTE — PATIENT INSTRUCTIONS
Thank you for following up with us at [facility] outpatient residency clinic! It was a pleasure to meet you today!     Our plan is the following:  - continue taking your blood pressure medication as prescribed on daily basis  - get your cologuard done when you can   - will see you back in 6 month unless any concerns arise before that please call the office to be seen sooner     If you have any additional questions or concerns, please call the office ([phone]) and speak to one of the staff. They will triage and forward the message to the doctors! Have a great rest of your day!

## 2024-10-07 DIAGNOSIS — I10 HYPERTENSION, UNSPECIFIED TYPE: ICD-10-CM

## 2024-10-07 RX ORDER — LOSARTAN POTASSIUM 50 MG/1
50 TABLET ORAL DAILY
Qty: 90 TABLET | Refills: 1 | Status: SHIPPED | OUTPATIENT
Start: 2024-10-07 | End: 2025-04-05

## 2024-11-04 ENCOUNTER — OFFICE VISIT (OUTPATIENT)
Age: 69
End: 2024-11-04
Payer: COMMERCIAL

## 2024-11-04 VITALS
BODY MASS INDEX: 21.6 KG/M2 | DIASTOLIC BLOOD PRESSURE: 46 MMHG | WEIGHT: 117.4 LBS | RESPIRATION RATE: 16 BRPM | HEIGHT: 62 IN | TEMPERATURE: 98 F | HEART RATE: 59 BPM | SYSTOLIC BLOOD PRESSURE: 139 MMHG

## 2024-11-04 DIAGNOSIS — I10 HYPERTENSION, UNSPECIFIED TYPE: Primary | ICD-10-CM

## 2024-11-04 PROCEDURE — 99213 OFFICE O/P EST LOW 20 MIN: CPT | Performed by: FAMILY MEDICINE

## 2024-11-04 PROCEDURE — 3078F DIAST BP <80 MM HG: CPT | Performed by: FAMILY MEDICINE

## 2024-11-04 PROCEDURE — 3075F SYST BP GE 130 - 139MM HG: CPT | Performed by: FAMILY MEDICINE

## 2024-11-04 PROCEDURE — 1123F ACP DISCUSS/DSCN MKR DOCD: CPT | Performed by: FAMILY MEDICINE

## 2024-11-04 PROCEDURE — 1159F MED LIST DOCD IN RCRD: CPT | Performed by: FAMILY MEDICINE

## 2024-11-04 PROCEDURE — 99211 OFF/OP EST MAY X REQ PHY/QHP: CPT

## 2024-11-04 SDOH — ECONOMIC STABILITY: FOOD INSECURITY: WITHIN THE PAST 12 MONTHS, YOU WORRIED THAT YOUR FOOD WOULD RUN OUT BEFORE YOU GOT MONEY TO BUY MORE.: NEVER TRUE

## 2024-11-04 SDOH — ECONOMIC STABILITY: FOOD INSECURITY: WITHIN THE PAST 12 MONTHS, THE FOOD YOU BOUGHT JUST DIDN'T LAST AND YOU DIDN'T HAVE MONEY TO GET MORE.: NEVER TRUE

## 2024-11-04 SDOH — ECONOMIC STABILITY: INCOME INSECURITY: HOW HARD IS IT FOR YOU TO PAY FOR THE VERY BASICS LIKE FOOD, HOUSING, MEDICAL CARE, AND HEATING?: NOT HARD AT ALL

## 2024-11-04 ASSESSMENT — ENCOUNTER SYMPTOMS
SHORTNESS OF BREATH: 0
CHEST TIGHTNESS: 0

## 2024-11-04 NOTE — PROGRESS NOTES
[facility]  [address] [place] [address]  Phone: [phone]  Fax: [phone]      Date of Visit: .TD  Patient Name: Evelin Frazier   Patient :  1955     ASSESSMENT/PLAN   1. Hypertension, unspecified type   - BP in office 148/48 repeat 139/46. Pt reports she is very stress out with many things in social life   - Had a  today of family member, having trouble with both cars and trying to get to vote tomorrow before its over    - will continue current medication Losartan 50 mg and monitor closely   - needs to return blood pressure cuff at home for a small size and log BP at home to make sure there isnt a need for medication adjustment. Pt has agreed to this plan.        Return in about 6 weeks (around 2024) for HTN .    HPI    Evelin Frazier is a 69 y.o. female who presents today to discuss   Chief Complaint   Patient presents with    Follow-up    Hypertension       Patient reports today to the office for follow-up of hypertension.  Patient's blood pressure today is 139/46.  The patient reports of feeling very stressed out to go to the appointment as a usual.  Patient also dealing with a family  today.  Patient reports also had a lot of stressors at home such as having 2 vehicles broken down trying to get to look tomorrow.  Patient reports she is compliant with her medication at home currently has a blood pressure cuff that is too large for her, guided her to reorder a smaller cuff or get that advised from her [facility].  Patient is sent home with a blood pressure log to monitor her blood pressures be follow-up in 6 weeks to make sure her dose is correct for her.     I personally reviewed the patient's past medical history, current medications, allergies, surgical history, family history and social history.  Updates were made as necessary.    REVIEW OF SYSTEM    Review of Systems   Constitutional:  Negative for

## 2024-11-04 NOTE — PATIENT INSTRUCTIONS
Thank you for following up with us at [facility] outpatient residency clinic! It was a pleasure to meet you today!     Our plan is the following:  - we will continue your current medication as is  - as we spoke try to get that blood pressure cuff returned at [facility] and get the right size and log some blood pressures for us  - we will follow up in 6 weeks     If you have any additional questions or concerns, please call the office ([phone]) and speak to one of the staff. They will triage and forward the message to the doctors! Have a great rest of your day!

## 2025-04-16 DIAGNOSIS — I10 HYPERTENSION, UNSPECIFIED TYPE: ICD-10-CM

## 2025-04-16 RX ORDER — LOSARTAN POTASSIUM 50 MG/1
50 TABLET ORAL DAILY
Qty: 90 TABLET | Refills: 1 | Status: SHIPPED | OUTPATIENT
Start: 2025-04-16 | End: 2025-10-13

## 2025-04-25 ENCOUNTER — OFFICE VISIT (OUTPATIENT)
Age: 70
End: 2025-04-25
Payer: COMMERCIAL

## 2025-04-25 VITALS
DIASTOLIC BLOOD PRESSURE: 84 MMHG | BODY MASS INDEX: 21.9 KG/M2 | WEIGHT: 119 LBS | HEART RATE: 64 BPM | SYSTOLIC BLOOD PRESSURE: 129 MMHG | HEIGHT: 62 IN | TEMPERATURE: 97.8 F | RESPIRATION RATE: 14 BRPM

## 2025-04-25 DIAGNOSIS — E78.00 HYPERCHOLESTEREMIA: ICD-10-CM

## 2025-04-25 DIAGNOSIS — R79.89 ELEVATED TSH: ICD-10-CM

## 2025-04-25 DIAGNOSIS — Z12.11 SCREENING FOR COLON CANCER: ICD-10-CM

## 2025-04-25 DIAGNOSIS — I10 PRIMARY HYPERTENSION: Primary | ICD-10-CM

## 2025-04-25 DIAGNOSIS — Z78.0 POST-MENOPAUSAL: ICD-10-CM

## 2025-04-25 DIAGNOSIS — R00.1 BRADYCARDIA: ICD-10-CM

## 2025-04-25 DIAGNOSIS — Z78.0 POSTMENOPAUSAL: ICD-10-CM

## 2025-04-25 DIAGNOSIS — R73.03 PREDIABETES: ICD-10-CM

## 2025-04-25 PROCEDURE — 3074F SYST BP LT 130 MM HG: CPT

## 2025-04-25 PROCEDURE — 1159F MED LIST DOCD IN RCRD: CPT

## 2025-04-25 PROCEDURE — 3078F DIAST BP <80 MM HG: CPT

## 2025-04-25 PROCEDURE — 99213 OFFICE O/P EST LOW 20 MIN: CPT

## 2025-04-25 PROCEDURE — 1123F ACP DISCUSS/DSCN MKR DOCD: CPT

## 2025-04-25 PROCEDURE — 99212 OFFICE O/P EST SF 10 MIN: CPT

## 2025-04-25 SDOH — ECONOMIC STABILITY: FOOD INSECURITY: WITHIN THE PAST 12 MONTHS, THE FOOD YOU BOUGHT JUST DIDN'T LAST AND YOU DIDN'T HAVE MONEY TO GET MORE.: NEVER TRUE

## 2025-04-25 SDOH — ECONOMIC STABILITY: FOOD INSECURITY: WITHIN THE PAST 12 MONTHS, YOU WORRIED THAT YOUR FOOD WOULD RUN OUT BEFORE YOU GOT MONEY TO BUY MORE.: NEVER TRUE

## 2025-04-25 ASSESSMENT — ENCOUNTER SYMPTOMS
VOMITING: 0
WHEEZING: 0
SHORTNESS OF BREATH: 0
COLOR CHANGE: 0
COUGH: 0
CONSTIPATION: 0
ABDOMINAL PAIN: 0
DIARRHEA: 0

## 2025-04-25 ASSESSMENT — PATIENT HEALTH QUESTIONNAIRE - PHQ9
2. FEELING DOWN, DEPRESSED OR HOPELESS: NOT AT ALL
SUM OF ALL RESPONSES TO PHQ QUESTIONS 1-9: 0
SUM OF ALL RESPONSES TO PHQ QUESTIONS 1-9: 0
1. LITTLE INTEREST OR PLEASURE IN DOING THINGS: NOT AT ALL
SUM OF ALL RESPONSES TO PHQ QUESTIONS 1-9: 0
SUM OF ALL RESPONSES TO PHQ QUESTIONS 1-9: 0

## 2025-04-25 NOTE — PATIENT INSTRUCTIONS
Thank you for following up with us at [facility] outpatient residency clinic! It was a pleasure to meet you today!     Our plan is the following:  -please log your blood pressure for two weeks without medication and we will make a wise decision of whether or not to continue   - get your labs done so we can discuss them if abnormal at next visit.     If you have any additional questions or concerns, please call the office ([phone]) and speak to one of the staff. They will triage and forward the message to the doctors! Have a great rest of your day!

## 2025-04-25 NOTE — PROGRESS NOTES
[facility]  [address] [place] [address]  Phone: [phone]  Fax: [phone]      Date of Visit: .TD  Patient Name: Evelin Frazier   Patient :  1955     ASSESSMENT/PLAN   1. Primary hypertension  -     Basic Metabolic Panel; Future  2. Elevated TSH  -     TSH; Future  3. Prediabetes  -     Hemoglobin A1C; Future  -     CBC with Auto Differential; Future  4. Hypercholesteremia  -     Lipid Panel; Future  5. Bradycardia  -     TSH; Future  6. Post-menopausal  -     DEXA Bone Density Axial Skeleton; Future  7. Screening for colon cancer  -     Fecal DNA Colorectal cancer screening (Cologuard)  8. Postmenopausal  -     DEXA Bone Density Axial Skeleton; Future     Pt refusing all vaccinations, except for DTaP..no record on file and unsure when she got last one done. We will search for records.     Return in about 2 weeks (around 2025) for BP .    HPI    Evelin Frazier is a 70 y.o. female who presents today to discuss   Chief Complaint   Patient presents with    Hypertension     Brought in home BP log today  ---  gdo    Medication Refill     Patient presenting to the office today to discuss blood pressure. PT reports she ran out of medication on Monday and at [facility] BP was on Tuesday 123/71. Pharmacist suggested to not take anything for her BP.  Pt provided a BP log mostly showing BP at 120's/70s. She does have one or two logged of 150s-140s systolic. Advised patient to take BP and log for 2 weeks without medication and we will see what her BP is like and decide then to continue or discontinue medication. PT is due for some labs. At this time patient has no additional concerns.     I personally reviewed the patient's past medical history, current medications, allergies, surgical history, family history and social history.  Updates were made as necessary.    REVIEW OF SYSTEM    Review of Systems   Constitutional:  Negative for

## 2025-05-05 ENCOUNTER — TELEPHONE (OUTPATIENT)
Age: 70
End: 2025-05-05

## 2025-05-05 NOTE — TELEPHONE ENCOUNTER
Missed Medicare Wellness visit today needs rescheduled. Left message for patient to call back to reschedule. Also was due to come back approx 5/9 for f/u HTN.  Try to schedule duo visit if possible with physician that can do Medicare visits.